# Patient Record
Sex: MALE | Race: WHITE | URBAN - METROPOLITAN AREA
[De-identification: names, ages, dates, MRNs, and addresses within clinical notes are randomized per-mention and may not be internally consistent; named-entity substitution may affect disease eponyms.]

---

## 2021-05-25 ENCOUNTER — RECORDS - HEALTHEAST (OUTPATIENT)
Dept: ADMINISTRATIVE | Facility: CLINIC | Age: 86
End: 2021-05-25

## 2023-01-01 ENCOUNTER — TRANSITIONAL CARE UNIT VISIT (OUTPATIENT)
Dept: GERIATRICS | Facility: CLINIC | Age: 88
End: 2023-01-01
Payer: MEDICARE

## 2023-01-01 ENCOUNTER — LAB REQUISITION (OUTPATIENT)
Dept: LAB | Facility: CLINIC | Age: 88
End: 2023-01-01
Payer: MEDICARE

## 2023-01-01 ENCOUNTER — MEDICAL CORRESPONDENCE (OUTPATIENT)
Dept: HEALTH INFORMATION MANAGEMENT | Facility: CLINIC | Age: 88
End: 2023-01-01
Payer: OTHER MISCELLANEOUS

## 2023-01-01 ENCOUNTER — TELEPHONE (OUTPATIENT)
Dept: GERIATRICS | Facility: CLINIC | Age: 88
End: 2023-01-01
Payer: MEDICARE

## 2023-01-01 ENCOUNTER — NURSING HOME VISIT (OUTPATIENT)
Dept: GERIATRICS | Facility: CLINIC | Age: 88
End: 2023-01-01
Payer: MEDICARE

## 2023-01-01 VITALS
HEART RATE: 62 BPM | OXYGEN SATURATION: 95 % | TEMPERATURE: 98.3 F | RESPIRATION RATE: 18 BRPM | WEIGHT: 152.3 LBS | DIASTOLIC BLOOD PRESSURE: 68 MMHG | SYSTOLIC BLOOD PRESSURE: 101 MMHG

## 2023-01-01 VITALS
TEMPERATURE: 97.7 F | WEIGHT: 153.5 LBS | HEIGHT: 69 IN | SYSTOLIC BLOOD PRESSURE: 113 MMHG | DIASTOLIC BLOOD PRESSURE: 58 MMHG | BODY MASS INDEX: 22.74 KG/M2 | RESPIRATION RATE: 16 BRPM | OXYGEN SATURATION: 92 % | HEART RATE: 72 BPM

## 2023-01-01 VITALS
DIASTOLIC BLOOD PRESSURE: 60 MMHG | BODY MASS INDEX: 23.92 KG/M2 | WEIGHT: 161.5 LBS | HEART RATE: 68 BPM | RESPIRATION RATE: 18 BRPM | SYSTOLIC BLOOD PRESSURE: 135 MMHG | TEMPERATURE: 97.6 F | OXYGEN SATURATION: 94 % | HEIGHT: 69 IN

## 2023-01-01 VITALS
HEART RATE: 69 BPM | TEMPERATURE: 97.5 F | BODY MASS INDEX: 22.87 KG/M2 | SYSTOLIC BLOOD PRESSURE: 136 MMHG | OXYGEN SATURATION: 97 % | RESPIRATION RATE: 20 BRPM | HEIGHT: 69 IN | WEIGHT: 154.4 LBS | DIASTOLIC BLOOD PRESSURE: 64 MMHG

## 2023-01-01 VITALS
HEART RATE: 82 BPM | OXYGEN SATURATION: 98 % | WEIGHT: 152.9 LBS | RESPIRATION RATE: 18 BRPM | DIASTOLIC BLOOD PRESSURE: 64 MMHG | HEIGHT: 69 IN | BODY MASS INDEX: 22.64 KG/M2 | SYSTOLIC BLOOD PRESSURE: 111 MMHG | TEMPERATURE: 97.6 F

## 2023-01-01 VITALS
OXYGEN SATURATION: 92 % | DIASTOLIC BLOOD PRESSURE: 63 MMHG | RESPIRATION RATE: 16 BRPM | WEIGHT: 159.8 LBS | HEIGHT: 69 IN | BODY MASS INDEX: 23.67 KG/M2 | SYSTOLIC BLOOD PRESSURE: 116 MMHG | HEART RATE: 67 BPM | TEMPERATURE: 98.3 F

## 2023-01-01 VITALS
BODY MASS INDEX: 22.64 KG/M2 | HEIGHT: 69 IN | RESPIRATION RATE: 16 BRPM | SYSTOLIC BLOOD PRESSURE: 94 MMHG | OXYGEN SATURATION: 94 % | HEART RATE: 66 BPM | WEIGHT: 152.9 LBS | TEMPERATURE: 98 F | DIASTOLIC BLOOD PRESSURE: 53 MMHG

## 2023-01-01 VITALS
SYSTOLIC BLOOD PRESSURE: 147 MMHG | WEIGHT: 151.7 LBS | HEART RATE: 87 BPM | RESPIRATION RATE: 20 BRPM | DIASTOLIC BLOOD PRESSURE: 73 MMHG | OXYGEN SATURATION: 93 % | TEMPERATURE: 98.1 F

## 2023-01-01 DIAGNOSIS — K59.00 CONSTIPATION, UNSPECIFIED CONSTIPATION TYPE: Primary | ICD-10-CM

## 2023-01-01 DIAGNOSIS — I25.118 CORONARY ARTERY DISEASE OF NATIVE ARTERY OF NATIVE HEART WITH STABLE ANGINA PECTORIS (H): ICD-10-CM

## 2023-01-01 DIAGNOSIS — G62.9 NEUROPATHY: ICD-10-CM

## 2023-01-01 DIAGNOSIS — D64.9 ANEMIA, UNSPECIFIED: ICD-10-CM

## 2023-01-01 DIAGNOSIS — Q25.72 PULMONARY ARTERIOVENOUS MALFORMATION: ICD-10-CM

## 2023-01-01 DIAGNOSIS — I35.0 NONRHEUMATIC AORTIC VALVE STENOSIS: Primary | ICD-10-CM

## 2023-01-01 DIAGNOSIS — K59.00 CONSTIPATION, UNSPECIFIED CONSTIPATION TYPE: ICD-10-CM

## 2023-01-01 DIAGNOSIS — I35.0 NONRHEUMATIC AORTIC VALVE STENOSIS: ICD-10-CM

## 2023-01-01 DIAGNOSIS — F03.B0 MODERATE DEMENTIA WITHOUT BEHAVIORAL DISTURBANCE, PSYCHOTIC DISTURBANCE, MOOD DISTURBANCE, OR ANXIETY, UNSPECIFIED DEMENTIA TYPE (H): ICD-10-CM

## 2023-01-01 DIAGNOSIS — Z51.5 HOSPICE CARE PATIENT: Primary | ICD-10-CM

## 2023-01-01 DIAGNOSIS — I35.0 SEVERE AORTIC STENOSIS: ICD-10-CM

## 2023-01-01 DIAGNOSIS — E78.2 MIXED HYPERLIPIDEMIA: ICD-10-CM

## 2023-01-01 DIAGNOSIS — R41.89 IMPAIRED COGNITIVE ABILITY: ICD-10-CM

## 2023-01-01 DIAGNOSIS — J44.9 MODERATE CHRONIC OBSTRUCTIVE PULMONARY DISEASE (H): ICD-10-CM

## 2023-01-01 DIAGNOSIS — N39.0 URINARY TRACT INFECTION, SITE NOT SPECIFIED: ICD-10-CM

## 2023-01-01 DIAGNOSIS — Z51.5 HOSPICE CARE PATIENT: ICD-10-CM

## 2023-01-01 DIAGNOSIS — I21.4 NSTEMI (NON-ST ELEVATED MYOCARDIAL INFARCTION) (H): Primary | ICD-10-CM

## 2023-01-01 DIAGNOSIS — R53.1 WEAKNESS: ICD-10-CM

## 2023-01-01 DIAGNOSIS — I25.118 CORONARY ARTERY DISEASE OF NATIVE ARTERY OF NATIVE HEART WITH STABLE ANGINA PECTORIS (H): Primary | ICD-10-CM

## 2023-01-01 DIAGNOSIS — I21.4 NSTEMI (NON-ST ELEVATED MYOCARDIAL INFARCTION) (H): ICD-10-CM

## 2023-01-01 LAB
ANION GAP SERPL CALCULATED.3IONS-SCNC: 14 MMOL/L (ref 7–15)
BACTERIA UR CULT: NORMAL
BASOPHILS # BLD AUTO: 0.1 10E3/UL (ref 0–0.2)
BASOPHILS NFR BLD AUTO: 1 %
BUN SERPL-MCNC: 27.2 MG/DL (ref 8–23)
CALCIUM SERPL-MCNC: 9.1 MG/DL (ref 8.2–9.6)
CHLORIDE SERPL-SCNC: 99 MMOL/L (ref 98–107)
CREAT SERPL-MCNC: 0.86 MG/DL (ref 0.67–1.17)
DEPRECATED HCO3 PLAS-SCNC: 21 MMOL/L (ref 22–29)
EOSINOPHIL # BLD AUTO: 0.1 10E3/UL (ref 0–0.7)
EOSINOPHIL NFR BLD AUTO: 1 %
ERYTHROCYTE [DISTWIDTH] IN BLOOD BY AUTOMATED COUNT: 13.9 % (ref 10–15)
GFR SERPL CREATININE-BSD FRML MDRD: 82 ML/MIN/1.73M2
GLUCOSE SERPL-MCNC: 114 MG/DL (ref 70–99)
HCT VFR BLD AUTO: 40.6 % (ref 40–53)
HGB BLD-MCNC: 13.1 G/DL (ref 13.3–17.7)
IMM GRANULOCYTES # BLD: 0 10E3/UL
IMM GRANULOCYTES NFR BLD: 0 %
LYMPHOCYTES # BLD AUTO: 1.3 10E3/UL (ref 0.8–5.3)
LYMPHOCYTES NFR BLD AUTO: 16 %
MCH RBC QN AUTO: 28.2 PG (ref 26.5–33)
MCHC RBC AUTO-ENTMCNC: 32.3 G/DL (ref 31.5–36.5)
MCV RBC AUTO: 88 FL (ref 78–100)
MONOCYTES # BLD AUTO: 0.7 10E3/UL (ref 0–1.3)
MONOCYTES NFR BLD AUTO: 9 %
NEUTROPHILS # BLD AUTO: 6 10E3/UL (ref 1.6–8.3)
NEUTROPHILS NFR BLD AUTO: 73 %
NRBC # BLD AUTO: 0 10E3/UL
NRBC BLD AUTO-RTO: 0 /100
PLATELET # BLD AUTO: 154 10E3/UL (ref 150–450)
POTASSIUM SERPL-SCNC: 4.4 MMOL/L (ref 3.4–5.3)
RBC # BLD AUTO: 4.64 10E6/UL (ref 4.4–5.9)
SODIUM SERPL-SCNC: 134 MMOL/L (ref 136–145)
WBC # BLD AUTO: 8.3 10E3/UL (ref 4–11)

## 2023-01-01 PROCEDURE — 99309 SBSQ NF CARE MODERATE MDM 30: CPT | Performed by: NURSE PRACTITIONER

## 2023-01-01 PROCEDURE — 99309 SBSQ NF CARE MODERATE MDM 30: CPT | Mod: GV | Performed by: NURSE PRACTITIONER

## 2023-01-01 PROCEDURE — 82310 ASSAY OF CALCIUM: CPT | Performed by: NURSE PRACTITIONER

## 2023-01-01 PROCEDURE — 99310 SBSQ NF CARE HIGH MDM 45: CPT | Mod: GV | Performed by: NURSE PRACTITIONER

## 2023-01-01 PROCEDURE — 99309 SBSQ NF CARE MODERATE MDM 30: CPT | Mod: GV | Performed by: FAMILY MEDICINE

## 2023-01-01 PROCEDURE — P9603 ONE-WAY ALLOW PRORATED MILES: HCPCS | Performed by: FAMILY MEDICINE

## 2023-01-01 PROCEDURE — 99305 1ST NF CARE MODERATE MDM 35: CPT | Performed by: FAMILY MEDICINE

## 2023-01-01 PROCEDURE — 85025 COMPLETE CBC W/AUTO DIFF WBC: CPT | Performed by: FAMILY MEDICINE

## 2023-01-01 PROCEDURE — 87086 URINE CULTURE/COLONY COUNT: CPT | Performed by: FAMILY MEDICINE

## 2023-01-01 RX ORDER — GUAIFENESIN 200 MG/1
400 TABLET ORAL EVERY 4 HOURS PRN
COMMUNITY

## 2023-01-01 RX ORDER — ALBUTEROL SULFATE 0.63 MG/3ML
1 SOLUTION RESPIRATORY (INHALATION) 2 TIMES DAILY
COMMUNITY

## 2023-01-01 RX ORDER — ASPIRIN 81 MG/1
81 TABLET, CHEWABLE ORAL DAILY
COMMUNITY

## 2023-01-01 RX ORDER — ACETAMINOPHEN 500 MG
1000 TABLET ORAL 2 TIMES DAILY
COMMUNITY

## 2023-01-01 RX ORDER — MULTIPLE VITAMINS W/ MINERALS TAB 9MG-400MCG
1 TAB ORAL DAILY
COMMUNITY
End: 2023-01-01

## 2023-01-01 RX ORDER — GABAPENTIN 100 MG/1
200 CAPSULE ORAL 2 TIMES DAILY
COMMUNITY
End: 2023-01-01

## 2023-01-01 RX ORDER — ALBUTEROL SULFATE 0.63 MG/3ML
1 SOLUTION RESPIRATORY (INHALATION) EVERY 4 HOURS PRN
COMMUNITY
End: 2023-01-01 | Stop reason: DRUGHIGH

## 2023-01-01 RX ORDER — BISACODYL 10 MG
10 SUPPOSITORY, RECTAL RECTAL DAILY PRN
COMMUNITY
Start: 2023-01-01

## 2023-01-01 RX ORDER — SIMVASTATIN 40 MG
40 TABLET ORAL DAILY
COMMUNITY
End: 2023-01-01

## 2023-01-01 RX ORDER — GABAPENTIN 100 MG/1
CAPSULE ORAL
COMMUNITY
Start: 2023-01-01

## 2023-01-01 RX ORDER — LORAZEPAM 0.5 MG/1
0.25 TABLET ORAL
COMMUNITY
Start: 2023-01-01

## 2023-01-01 RX ORDER — POLYETHYLENE GLYCOL 3350 17 G/17G
17 POWDER, FOR SOLUTION ORAL DAILY PRN
COMMUNITY

## 2023-01-01 RX ORDER — NALOXONE HYDROCHLORIDE 0.4 MG/ML
.1-.4 INJECTION, SOLUTION INTRAMUSCULAR; INTRAVENOUS; SUBCUTANEOUS
COMMUNITY

## 2023-07-19 PROBLEM — J44.9 MODERATE CHRONIC OBSTRUCTIVE PULMONARY DISEASE (H): Status: ACTIVE | Noted: 2023-02-16

## 2023-07-19 PROBLEM — Q25.72 PULMONARY ARTERIOVENOUS MALFORMATION: Status: ACTIVE | Noted: 2023-02-16

## 2023-07-19 PROBLEM — I25.10 CORONARY ARTERY DISEASE INVOLVING NATIVE HEART: Status: ACTIVE | Noted: 2023-01-01

## 2023-07-19 PROBLEM — I35.0 AORTIC VALVE STENOSIS: Status: ACTIVE | Noted: 2023-02-16

## 2023-07-19 PROBLEM — I21.4 NSTEMI (NON-ST ELEVATED MYOCARDIAL INFARCTION) (H): Status: ACTIVE | Noted: 2023-02-16

## 2023-07-19 NOTE — PROGRESS NOTES
[facility]  PCP & CLINIC: No primary care provider on file., No primary physician on file.  Chief Complaint   Patient presents with     Hospital F/U   [place] MRN: [id number]. Place of Service where encounter took place:  [facility] ON [facility] (TCU) [4002] Luc Nguyen  is a 89 year old  (4/16/1932), admitted to the above facility from  [facility]. Hospital stay 7/15 through 7/19/23. Admitted to this facility for  rehab, medical management, and nursing care. HPI information obtained from: facility chart records, facility staff, patient report, [facility] chart review, and Care Everywhere [facility] chart review.     Brief Summary of Hospital Course: Luc presented to [place] on 7/15 w/ chest pain. He was found to have NSTEMI, was started on IV Heparin for 48 hours and was evaluated by cardiology. He and family decided at that time to enroll into hospice potentially, but then discharged to TCU.     Updates since admission to transitional care unit: Luc presented to [facility] on 7/19 s/p the above hospitalization. Today, Luc is seen after having a shower in therapy.  He says he feels so good after this.  He says his only real complaint is the pins-and-needles he feels in his heels, which kept him up last night.  He says he has had this for a long time, and usually puts some cream on to help.  He acknowledges that he had a heart attack, and says that he feels a lot better now.  He denies headaches, dizziness, chest pain, palpitations, and he is not really having shortness of breath unless he exerts himself.  He does not think he is having a lot of swelling either.  His appetite is pretty good, and he denies any nausea.  He says his bowels and bladder are baseline, and he wears a depends because his bladder leaks often.    CODE STATUS/ADVANCE DIRECTIVES DISCUSSION: DNR / DNI. Patient's living condition: lives alone. ALLERGIES: Patient has no known allergies. PAST MEDICAL HISTORY:  has no past  medical history on file.. PAST SURGICAL HISTORY:   has no past surgical history on file.. FAMILY HISTORY: family history is not on file.. SOCIAL HISTORY:     Post Discharge Medication Reconciliation Status: discharge medications reconciled and changed, per note/orders.  Current Outpatient Medications   Medication Sig Dispense Refill     acetaminophen (TYLENOL) 500 MG tablet Take 1,000 mg by mouth every 6 hours as needed for mild pain       albuterol (ACCUNEB) 0.63 MG/3ML neb solution Take 1 ampule by nebulization every 4 hours as needed for shortness of breath, wheezing or cough       aspirin (ASA) 81 MG chewable tablet Take 81 mg by mouth daily       carbamide peroxide (DEBROX) 6.5 % otic solution Place 5 drops into both ears 2 times daily as needed for other       diclofenac (VOLTAREN) 1 % topical gel Apply topically 4 times daily       gabapentin (NEURONTIN) 100 MG capsule Take 100 mg by mouth 2 times daily       multivitamin w/minerals (THERA-VIT-M) tablet Take 1 tablet by mouth daily       naloxone (NARCAN) 0.4 MG/ML injection Inject 0.1-0.4 mg into the vein ) Inject 1 ml intramuscularly as needed  for opioid overdose Give 1 ml as needed for opioid  overdose. May give 1 ml in 2-3 minutes if patient is  still unresponsive       polyethylene glycol (MIRALAX) 17 g packet Take 17 g by mouth daily       simvastatin (ZOCOR) 40 MG tablet Take 40 mg by mouth daily       ROS: Limited secondary to cognitive impairment but today pt reports the above and 10 point ROS of systems including Constitutional, Eyes, Respiratory, Cardiovascular, Gastroenterology, Genitourinary, Integumentary, Musculoskeletal, Psychiatric were all negative except for pertinent positives noted in my HPI.    Vitals: /68   Pulse 62   Temp 98.3  F (36.8  C)   Resp 18   Wt 69.1 kg (152 lb 4.8 oz)   SpO2 95%   Exam:  GENERAL APPEARANCE: Alert, in no distress, cooperative.   ENT: Mouth/posterior oropharynx intact w/ moist mucous membranes,  hearing acuity [place].  EYES: EOM, conjunctivae, lids, pupils and irises normal, PERRL2.   RESP: Respiratory effort good, no respiratory distress, Lung sounds clear/diminished. On RA.   CV: Auscultation of heart reveals S1, S2, rate and rhythm regular, 2/6 systolic murmur, no rub or gallop, Edema trace BLE. Peripheral pulses are 2+.  ABDOMEN: Normal bowel sounds, soft, non-tender abdomen, and no masses palpated.  SKIN: Inspection/Palpation of skin and subcutaneous tissue baseline w/ fragility. No wounds/rashes noted.   NEURO: CN II-XII at patient's baseline, sensation baseline PPS.  PSYCH: Insight, judgement, and memory are baseline impaired, affect and mood are happy/engaged.    Lab/Diagnostic data: Recent labs in [facility] reviewed by me today.     ASSESSMENT/PLAN:  NSTEMI (non-ST elevated myocardial infarction) (H)  Coronary artery disease of native artery of native heart with stable angina pectoris (H)  Pulmonary arteriovenous malformation  Nonrheumatic aortic valve stenosis  Neuropathy  Impaired cognitive ability  Acute on chronic. Complex.     Provider extensively reviewed records from inpatient and from facility.    Provider coordinated care with rehabilitation services, who reported SLUMS 16/30.  Luc was driving prior to this heart attack, and he likely should not have been.    Will reduce polypharmacy, decreasing multivitamin to daily and discontinuing guaifenesin as this is available on standing house orders.    Noting ongoing anemia and weakness, which could be secondary to his recent hospitalization or chronic.  Either way, this will affect his rehabilitation capability, and therefore will trend.    Noting some neuropathy type symptoms reported, which are worse at night.  Luc reports this to be a longstanding problem, but upon chart review a cream to help neuropathy was not located.  We will therefore trial a chest dose of capsaicin to see if he likes this.    Upon chart review, noting that this patient  and his family had opted for hospice services, yet he is rehabbing in TCU (which is usually contradictory).  This may complicate his discharge.  Provider coordinated care with  to determine if his goal/plan will be to rehab/optimize and return home and then enroll in hospice, or if he will be staying to optimize and then move into long-term care with hospice.  Certainly, his age and overall debility would be appropriate for hospice enrollment.  Given his cognitive impairment, would not recommend that he return home.  We may see improvement here with routine/activity in TCU, hydration/nutrition, and the overall controlled environment that is offerred.  Follow up w/in 1 week or as needed.     Orders:  1. Decrease MVI to 1 tab PO QAM. Dx: malnutrition.   2. Discontinue Guaifenesin (may use PIERRE).   3. Hgb+BMP x1 on 7/24. Dx: anemia/weakness.  4. Capsaicin cream 0.025%, apply to feet at bedtime. Dx: neuropathy.     Electronically signed by:  LAVINIA Landry CNP DNP

## 2023-07-20 NOTE — LETTER
7/20/2023        RE: Luc Nguyen  [address] [place] [address]        [facility] TCU Admission  PCP & CLINIC: No primary care provider on file., No primary physician on file.  Chief Complaint   Patient presents with     Hospital F/U   [place] MRN: [id number]. Place of Service where encounter took place:  [facility] (TCU) [4002] Luc Nguyen  is a 89 year old  (4/16/1932), admitted to the above facility from  [facility]. Hospital stay 7/15 through 7/19/23. Admitted to this facility for  rehab, medical management, and nursing care. HPI information obtained from: facility chart records, facility staff, patient report, [facility] chart review, and Care Everywhere [facility] chart review.     Brief Summary of Hospital Course: Luc presented to [place] on 7/15 w/ chest pain. He was found to have NSTEMI, was started on IV Heparin for 48 hours and was evaluated by cardiology. He and family decided at that time to enroll into hospice potentially, but then discharged to TCU.     Updates since admission to transitional care unit: Luc presented to TCU on 7/19 s/p the above hospitalization. Today, Luc is seen after having a shower in therapy.  He says he feels so good after this.  He says his only real complaint is the pins-and-needles he feels in his heels, which kept him up last night.  He says he has had this for a long time, and usually puts some cream on to help.  He acknowledges that he had a heart attack, and says that he feels a lot better now.  He denies headaches, dizziness, chest pain, palpitations, and he is not really having shortness of breath unless he exerts himself.  He does not think he is having a lot of swelling either.  His appetite is pretty good, and he denies any nausea.  He says his bowels and bladder are baseline, and he wears a depends because his bladder leaks often.    CODE STATUS/ADVANCE DIRECTIVES DISCUSSION: DNR / DNI. Patient's living condition: lives  alone. ALLERGIES: Patient has no known allergies. PAST MEDICAL HISTORY:  has no past medical history on file.. PAST SURGICAL HISTORY:   has no past surgical history on file.. FAMILY HISTORY: family history is not on file.. SOCIAL HISTORY:     Post Discharge Medication Reconciliation Status: discharge medications reconciled and changed, per note/orders.  Current Outpatient Medications   Medication Sig Dispense Refill     acetaminophen (TYLENOL) 500 MG tablet Take 1,000 mg by mouth every 6 hours as needed for mild pain       albuterol (ACCUNEB) 0.63 MG/3ML neb solution Take 1 ampule by nebulization every 4 hours as needed for shortness of breath, wheezing or cough       aspirin (ASA) 81 MG chewable tablet Take 81 mg by mouth daily       carbamide peroxide (DEBROX) 6.5 % otic solution Place 5 drops into both ears 2 times daily as needed for other       diclofenac (VOLTAREN) 1 % topical gel Apply topically 4 times daily       gabapentin (NEURONTIN) 100 MG capsule Take 100 mg by mouth 2 times daily       multivitamin w/minerals (THERA-VIT-M) tablet Take 1 tablet by mouth daily       naloxone (NARCAN) 0.4 MG/ML injection Inject 0.1-0.4 mg into the vein ) Inject 1 ml intramuscularly as needed  for opioid overdose Give 1 ml as needed for opioid  overdose. May give 1 ml in 2-3 minutes if patient is  still unresponsive       polyethylene glycol (MIRALAX) 17 g packet Take 17 g by mouth daily       simvastatin (ZOCOR) 40 MG tablet Take 40 mg by mouth daily       ROS: Limited secondary to cognitive impairment but today pt reports the above and 10 point ROS of systems including Constitutional, Eyes, Respiratory, Cardiovascular, Gastroenterology, Genitourinary, Integumentary, Musculoskeletal, Psychiatric were all negative except for pertinent positives noted in my HPI.    Vitals: /68   Pulse 62   Temp 98.3  F (36.8  C)   Resp 18   Wt 69.1 kg (152 lb 4.8 oz)   SpO2 95%   Exam:  GENERAL APPEARANCE: Alert, in no distress,  cooperative.   ENT: Mouth/posterior oropharynx intact w/ moist mucous membranes, hearing acuity [place].  EYES: EOM, conjunctivae, lids, pupils and irises normal, PERRL2.   RESP: Respiratory effort good, no respiratory distress, Lung sounds clear/diminished. On RA.   CV: Auscultation of heart reveals S1, S2, rate and rhythm regular, 2/6 systolic murmur, no rub or gallop, Edema trace BLE. Peripheral pulses are 2+.  ABDOMEN: Normal bowel sounds, soft, non-tender abdomen, and no masses palpated.  SKIN: Inspection/Palpation of skin and subcutaneous tissue baseline w/ fragility. No wounds/rashes noted.   NEURO: CN II-XII at patient's baseline, sensation baseline PPS.  PSYCH: Insight, judgement, and memory are baseline impaired, affect and mood are happy/engaged.    Lab/Diagnostic data: Recent labs in [facility] reviewed by me today.     ASSESSMENT/PLAN:  NSTEMI (non-ST elevated myocardial infarction) (H)  Coronary artery disease of native artery of native heart with stable angina pectoris (H)  Pulmonary arteriovenous malformation  Nonrheumatic aortic valve stenosis  Neuropathy  Impaired cognitive ability  Acute on chronic. Complex.     Provider extensively reviewed records from inpatient and from facility.    Provider coordinated care with rehabilitation services, who reported SLUMS 16/30.  Luc was driving prior to this heart attack, and he likely should not have been.    Will reduce polypharmacy, decreasing multivitamin to daily and discontinuing guaifenesin as this is available on standing house orders.    Noting ongoing anemia and weakness, which could be secondary to his recent hospitalization or chronic.  Either way, this will affect his rehabilitation capability, and therefore will trend.    Noting some neuropathy type symptoms reported, which are worse at night.  Luc reports this to be a longstanding problem, but upon chart review a cream to help neuropathy was not located.  We will therefore trial a chest dose of  capsaicin to see if he likes this.    Upon chart review, noting that this patient and his family had opted for hospice services, yet he is rehabbing in TCU (which is usually contradictory).  This may complicate his discharge.  Provider coordinated care with  to determine if his goal/plan will be to rehab/optimize and return home and then enroll in hospice, or if he will be staying to optimize and then move into long-term care with hospice.  Certainly, his age and overall debility would be appropriate for hospice enrollment.  Given his cognitive impairment, would not recommend that he return home.  We may see improvement here with routine/activity in TCU, hydration/nutrition, and the overall controlled environment that is offerred.  Follow up w/in 1 week or as needed.     Orders:  1. Decrease MVI to 1 tab PO QAM. Dx: malnutrition.   2. Discontinue Guaifenesin (may use PIERRE).   3. Hgb+BMP x1 on 7/24. Dx: anemia/weakness.  4. Capsaicin cream 0.025%, apply to feet at bedtime. Dx: neuropathy.     Electronically signed by:  Dr. Roxane Cuevas APRN CNP DNP                          Sincerely,        Roxane Cuevas, LAVINIA CNP

## 2023-07-23 NOTE — PROGRESS NOTES
Staff called this evening reporting patient complaints of achiness and malaise, temperature checked and noted to be 103.4, other vital signs stable outside of mild tachycardia at 102 bpm.  Patient's lung sounds clear, no other visible infection externally.  He has received Tylenol.  Nursing instructed to place cool compresses behind neck, and bilateral axilla, and behind posterior bilateral knees.     Orders:  [facility] UC  CBC and  BMP on Monday  Follow-up with primary  Update on-call providers for further change in condition    Dr. Rose Troy, APRN, DNP

## 2023-07-24 NOTE — TELEPHONE ENCOUNTER
[facility] Lab Note     Provider: LAVINIA Joyner CNP, DNP  Facility: [facility] Facility Type:  TCU    No Known Allergies    Labs Reviewed by provider: CBC and BMP    Verbal Order/Direction given by Provider: NNO    Provider giving Order:  LAVINIA Joyner CNP, DNP    Verbal Order given to: Corina Haley RN

## 2023-07-25 PROBLEM — E78.2 MIXED HYPERLIPIDEMIA: Status: ACTIVE | Noted: 2023-02-16

## 2023-07-25 PROBLEM — M75.100 ROTATOR CUFF TEAR ARTHROPATHY: Status: ACTIVE | Noted: 2023-01-01

## 2023-07-25 PROBLEM — H54.50: Status: ACTIVE | Noted: 2023-01-01

## 2023-07-25 PROBLEM — H61.23 IMPACTED CERUMEN, BILATERAL: Status: ACTIVE | Noted: 2023-01-01

## 2023-07-25 PROBLEM — S93.402A SPRAIN OF UNSPECIFIED LIGAMENT OF LEFT ANKLE, INITIAL ENCOUNTER: Status: ACTIVE | Noted: 2023-01-01

## 2023-07-25 PROBLEM — H47.20 UNSPECIFIED OPTIC ATROPHY: Status: ACTIVE | Noted: 2023-01-01

## 2023-07-25 PROBLEM — M12.819 ROTATOR CUFF TEAR ARTHROPATHY: Status: ACTIVE | Noted: 2023-01-01

## 2023-07-25 PROBLEM — J85.2 ABSCESS OF LUNG (H): Status: ACTIVE | Noted: 2023-01-01

## 2023-07-25 PROBLEM — N40.0 BENIGN PROSTATIC HYPERPLASIA: Status: ACTIVE | Noted: 2023-02-16

## 2023-07-25 PROBLEM — H53.9 UNSPECIFIED VISUAL DISTURBANCE: Status: ACTIVE | Noted: 2023-01-01

## 2023-07-25 PROBLEM — Z91.81 HISTORY OF FALL: Status: ACTIVE | Noted: 2023-01-01

## 2023-07-25 PROBLEM — H93.13 TINNITUS, BILATERAL: Status: ACTIVE | Noted: 2023-01-01

## 2023-07-25 PROBLEM — H35.363 DRUSEN (DEGENERATIVE) OF MACULA, BILATERAL: Status: ACTIVE | Noted: 2023-01-01

## 2023-07-25 PROBLEM — H35.30 UNSPECIFIED MACULAR DEGENERATION: Status: ACTIVE | Noted: 2023-01-01

## 2023-07-25 NOTE — PROGRESS NOTES
[facility]  Episodic/Acute/Follow-Up  [place] MRN: [id number]. [facility]   Chief Complaint   Patient presents with    RECHECK    HPI: Luc Nguyen  is a 89 year old (4/16/1932), who is being seen today for an episodic care visit. Today's concern is:    [place] seen today on routine follow-up in TCU.  Last week, we trialed the start of capsaicin at at bedtime to help him with neuropathy symptoms while he sleeps.    Today, Dallas says capsaicin has not been helpful.  He still feels like he wakes up with neuropathy and even describes achiness on and off in lower extremities.  He denies any new or ongoing chest pain, says sometimes he gets short of breath, but he does not feel like he is swollen.  He denies headaches or dizziness.    Past Medical and Surgical History reviewed in Epic today.  MEDICATIONS:  Current Outpatient Medications   Medication Sig Dispense Refill    acetaminophen (TYLENOL) 500 MG tablet Take 1,000 mg by mouth 2 times daily +650mg PO BID PRN      albuterol (ACCUNEB) 0.63 MG/3ML neb solution Take 1 ampule by nebulization every 4 hours as needed for shortness of breath, wheezing or cough      aspirin (ASA) 81 MG chewable tablet Take 81 mg by mouth daily      carbamide peroxide (DEBROX) 6.5 % otic solution Place 5 drops into both ears 2 times daily as needed for other      diclofenac (VOLTAREN) 1 % topical gel Apply topically 4 times daily      gabapentin (NEURONTIN) 100 MG capsule Take 200 mg by mouth 2 times daily      multivitamin w/minerals (THERA-VIT-M) tablet Take 1 tablet by mouth daily      naloxone (NARCAN) 0.4 MG/ML injection Inject 0.1-0.4 mg into the vein ) Inject 1 ml intramuscularly as needed  for opioid overdose Give 1 ml as needed for opioid  overdose. May give 1 ml in 2-3 minutes if patient is  still unresponsive      polyethylene glycol (MIRALAX) 17 g packet Take 17 g by mouth daily       simvastatin (ZOCOR) 40 MG tablet Take 40 mg by mouth daily       Objective: BP (!) 147/73   Pulse 87   Temp 98.1  F (36.7  C)   Resp 20   Wt 68.8 kg (151 lb 11.2 oz)   SpO2 93%   Exam:  GENERAL APPEARANCE: Alert, in no distress, cooperative.   RESP: Respiratory effort good, no respiratory distress, Lung sounds clear. On RA.   CV: Auscultation of heart reveals S1, S2, rate and rhythm regular, no murmur, no rub or gallop, Edema 0+ BLE. Peripheral pulses are 2+.  PSYCH: Insight, judgement, and memory are impaired at baseline, affect and mood are happy/engaged.    Labs: Labs done in facility are in EPIC. Please refer to them using [employer] Everywhere.    ASSESSMENT/PLAN:  NSTEMI (non-ST elevated myocardial infarction) (H)  Coronary artery disease of native artery of native heart with stable angina pectoris (H)  Pulmonary arteriovenous malformation  Nonrheumatic aortic valve stenosis  Neuropathy  Impaired cognitive ability  Acute on chronic. Ongoing.  Noting physical therapy is likely helping with a lot of deconditioning, but [place] may be having some achiness from this likely due to some underlying arthritis.  The description of achiness is intermittent, but perhaps pain control with some additional Tylenol on a schedule would be helpful.  D[place] does not always ask for as needed medications.  Capsaicin not very helpful.  Will discontinue.  Provider coordinated care with nursing, and they are going to institute a foot soak before bedtime to help him relax.  It may also help to slightly increase gabapentin.  We will increase as noted below.  There are no ongoing signs of chest pain, CHF/fluid retention. Weights are stable. SOB likely from deconditioning.   Follow up w/in 1 week or as needed.    Orders:  Tylenol 1000mg PO BID. Dx: OA.  Discontinue Capsaicin.  Increase Gabapentin to 200mg BID. Dx: neuropathy.   Change PRN Tylenol to 650mg PO BID PRN. Dx: pain/fever.     Electronically signed by:  LAVINIA Landry  CNP DNP

## 2023-07-25 NOTE — LETTER
7/25/2023        RE: Luc Nguyen  [address] [place] [address]        [facility] GERIATRIC SERVICE  Episodic/Acute/Follow-Up  [place] MRN: [id number]. Place of Service where encounter took place:  [facility] [4002]   Chief Complaint   Patient presents with     RECHECK    HPI: Luc Nguyen  is a 89 year old (4/16/1932), who is being seen today for an episodic care visit. Today's concern is:    [place] seen today on routine follow-up in TCU.  Last week, we trialed the start of capsaicin at at bedtime to help him with neuropathy symptoms while he sleeps.    Today, [place] says capsaicin has not been helpful.  He still feels like he wakes up with neuropathy and even describes achiness on and off in lower extremities.  He denies any new or ongoing chest pain, says sometimes he gets short of breath, but he does not feel like he is swollen.  He denies headaches or dizziness.    Past Medical and Surgical History reviewed in Epic today.  MEDICATIONS:  Current Outpatient Medications   Medication Sig Dispense Refill     acetaminophen (TYLENOL) 500 MG tablet Take 1,000 mg by mouth 2 times daily +650mg PO BID PRN       albuterol (ACCUNEB) 0.63 MG/3ML neb solution Take 1 ampule by nebulization every 4 hours as needed for shortness of breath, wheezing or cough       aspirin (ASA) 81 MG chewable tablet Take 81 mg by mouth daily       carbamide peroxide (DEBROX) 6.5 % otic solution Place 5 drops into both ears 2 times daily as needed for other       diclofenac (VOLTAREN) 1 % topical gel Apply topically 4 times daily       gabapentin (NEURONTIN) 100 MG capsule Take 200 mg by mouth 2 times daily       multivitamin w/minerals (THERA-VIT-M) tablet Take 1 tablet by mouth daily       naloxone (NARCAN) 0.4 MG/ML injection Inject 0.1-0.4 mg into the vein ) Inject 1 ml intramuscularly as needed  for opioid overdose Give 1 ml as needed for opioid  overdose. May give 1 ml in 2-3 minutes if patient is  still  unresponsive       polyethylene glycol (MIRALAX) 17 g packet Take 17 g by mouth daily       simvastatin (ZOCOR) 40 MG tablet Take 40 mg by mouth daily       Objective: BP (!) 147/73   Pulse 87   Temp 98.1  F (36.7  C)   Resp 20   Wt 68.8 kg (151 lb 11.2 oz)   SpO2 93%   Exam:  GENERAL APPEARANCE: Alert, in no distress, cooperative.   RESP: Respiratory effort good, no respiratory distress, Lung sounds clear. On RA.   CV: Auscultation of heart reveals S1, S2, rate and rhythm regular, no murmur, no rub or gallop, Edema 0+ BLE. Peripheral pulses are 2+.  PSYCH: Insight, judgement, and memory are impaired at baseline, affect and mood are happy/engaged.    Labs: Labs done in facility are in EPIC. Please refer to them using [employer] Everywhere.    ASSESSMENT/PLAN:  NSTEMI (non-ST elevated myocardial infarction) (H)  Coronary artery disease of native artery of native heart with stable angina pectoris (H)  Pulmonary arteriovenous malformation  Nonrheumatic aortic valve stenosis  Neuropathy  Impaired cognitive ability  Acute on chronic. Ongoing.  Noting physical therapy is likely helping with a lot of deconditioning, but [place] may be having some achiness from this likely due to some underlying arthritis.  The description of achiness is intermittent, but perhaps pain control with some additional Tylenol on a schedule would be helpful.  [place] does not always ask for as needed medications.  Capsaicin not very helpful.  Will discontinue.  Provider coordinated care with nursing, and they are going to institute a foot soak before bedtime to help him relax.  It may also help to slightly increase gabapentin.  We will increase as noted below.  There are no ongoing signs of chest pain, CHF/fluid retention. Weights are stable. SOB likely from deconditioning.   Follow up w/in 1 week or as needed.    Orders:  Tylenol 1000mg PO BID. Dx: OA.  Discontinue Capsaicin.  Increase Gabapentin to 200mg BID. Dx: neuropathy.   Change PRN Tylenol to  650mg PO BID PRN. Dx: pain/fever.     Electronically signed by:  Dr. Roxane Cuevas, LAVINIA CNP DNP        Sincerely,        LAVINIA Jensen CNP

## 2023-07-27 NOTE — TELEPHONE ENCOUNTER
[facility] Triage Nurse Telephone Encounter    Provider: LAVINIA Joyner CNP, DNP  Facility: [facility] Facility Type:  TCU    Caller: Corina  Call Back Number: [phone]    Allergies:  No Known Allergies     Reason for call: Pt has COPD and family is requesting that his nebulizer treatment be scheduled as is at home.    Verbal Order/Direction given by Provider:   1) Albuterol Neb BID and BID PRN  2) Guaifenesin (plain) 400 mg PO q 4 hours prn (expectorant)     Provider giving Order:  Tesfaye Boggs MD    Verbal Order given to: Corina Castle RN

## 2023-07-27 NOTE — PROGRESS NOTES
"[place] [facility]  PRIMARY CARE PROVIDER AND CLINIC:  Physician No Ref-Primary, No address on file  No chief complaint on file.    [place] Medical Record Number:  [id number]  Place of Service where encounter took place:  No question data found.    Luc Nguyen  is a 89 year old  (4/16/1932), admitted to the above facility from  [facility] . Hospital stay 7/15/23 through 7/19/23..  Admitted to this facility for  rehab, medical management, and nursing care.    HPI:    HPI information obtained from: facility chart records, facility staff, patient report and [facility] chart review.   Brief Summary of Hospital Course:   As per DNP's note:\"Luc presented to [facility] on 7/15 w/ chest pain. He was found to have NSTEMI, was started on IV Heparin for 48 hours and was evaluated by cardiology. He and family decided at that time to enroll into hospice potentially, but then discharged to TCU.       Updates on Status Since Skilled nursing Admission:   [place]um 16/30 7/25: gabapentin increased, capsicin stopped  7/27: home dose of Albuterol bid started plus bid prn, also, guaifenesin 400 mg q 4 hr prn started.     Today:  - NSTEMI: no cp.   - Debility: making a progress with rehab.  - COPD:  Reports breathing is fine.   -hospice: enrolled into hospice Feb 2023 and disenrolled due to stability in may. Wants to stay a this facility, try therpay first and see how it goes.   - Neuropathy: worse at night, not that bad during day time.     ==========================================  CODE STATUS/ADVANCE DIRECTIVES DISCUSSION:   DNR / DNI  Patient's living condition: lives alone  ALLERGIES: Patient has no known allergies.  PAST MEDICAL HISTORY:  has no past medical history on file.  PAST SURGICAL HISTORY:   has no past surgical history on file.  FAMILY HISTORY: family history is not on file.  SOCIAL HISTORY:       Post Discharge Medication Reconciliation Status: discharge medications reconciled and changed, per " note/orders  Current Outpatient Medications   Medication Sig Dispense Refill     acetaminophen (TYLENOL) 500 MG tablet Take 1,000 mg by mouth 2 times daily +650mg PO BID PRN       albuterol (ACCUNEB) 0.63 MG/3ML neb solution Take 1 ampule by nebulization 2 times daily May also take BID PRN       aspirin (ASA) 81 MG chewable tablet Take 81 mg by mouth daily       carbamide peroxide (DEBROX) 6.5 % otic solution Place 5 drops into both ears 2 times daily as needed for other       diclofenac (VOLTAREN) 1 % topical gel Apply topically 4 times daily       gabapentin (NEURONTIN) 100 MG capsule Take 200 mg by mouth 2 times daily       guaiFENesin (ORGANIDIN) 200 MG tablet Take 400 mg by mouth every 4 hours as needed for cough       multivitamin w/minerals (THERA-VIT-M) tablet Take 1 tablet by mouth daily       naloxone (NARCAN) 0.4 MG/ML injection Inject 0.1-0.4 mg into the vein ) Inject 1 ml intramuscularly as needed  for opioid overdose Give 1 ml as needed for opioid  overdose. May give 1 ml in 2-3 minutes if patient is  still unresponsive       polyethylene glycol (MIRALAX) 17 g packet Take 17 g by mouth daily       simvastatin (ZOCOR) 40 MG tablet Take 40 mg by mouth daily       ROS:  10 point ROS of systems including Constitutional, Eyes, Respiratory, Cardiovascular, Gastroenterology, Genitourinary, Integumentary, Musculoskeletal, Psychiatric were all negative except for pertinent positives noted in my HPI.    Vitals:  There were no vitals taken for this visit.  Exam:  GENERAL APPEARANCE:  in no distress,   RESP:  Unlabored breathing. CTA b/l.   CV:  S1S2 audible, regular HR, systolic murmur appreciated.  Over right side.   ABDOMEN:  soft, NT/ND, BS audible.   M/S:   no joint deformity noted on observation.   SKIN:  No rash noted on observation  NEURO:   No NFD appreciated on observation.   PSYCH:  affect and mood normal      Lab/Diagnostic data: Reviewed in the chart and EHR.        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   - * Evaluated by PMR and felt to benefit from TCU placement.  started rehab program, making a progress, continue until desired goal is achieved.   - Daughter at the bedside reports father would want to stay here as an LTC. Asked if he will still get some rehab. He is a [employer] will family is working on support.       (G62.9) Neuropathy  - feet, worse at night, currently on gabapentin 200 mg bid. Will increase night dose to 400 mg, keep day time as is.   - assess in one week.       Electronically signed by:  Tesfaye Boggs MD

## 2023-07-28 NOTE — LETTER
"    7/28/2023        RE: Luc Nguyen  [address] [place] [address]        [place] [facility]  PRIMARY CARE PROVIDER AND CLINIC:  Physician No Ref-Primary, No address on file  No chief complaint on file.    [place] Medical Record Number:  [id number]  Place of Service where encounter took place:  No question data found.    Luc Nguyen  is a 89 year old  (4/16/1932), admitted to the above facility from  [facility] . Hospital stay 7/15/23 through 7/19/23..  Admitted to this facility for  rehab, medical management, and nursing care.    HPI:    HPI information obtained from: facility chart records, facility staff, patient report and [facility] chart review.   Brief Summary of Hospital Course:   As per DNP's note:\"Luc presented to [facility] on 7/15 w/ chest pain. He was found to have NSTEMI, was started on IV Heparin for 48 hours and was evaluated by cardiology. He and family decided at that time to enroll into hospice potentially, but then discharged to TCU.       Updates on Status Since Skilled nursing Admission:   [place] 16/30 7/25: gabapentin increased, capsicin stopped  7/27: home dose of Albuterol bid started plus bid prn, also, guaifenesin 400 mg q 4 hr prn started.     Today:  - NSTEMI: no cp.   - Debility: making a progress with rehab.  - COPD:  Reports breathing is fine.   -hospice: enrolled into hospice Feb 2023 and disenrolled due to stability in may. Wants to stay a this facility, try therpay first and see how it goes.   - Neuropathy: worse at night, not that bad during day time.     ==========================================  CODE STATUS/ADVANCE DIRECTIVES DISCUSSION:   DNR / DNI  Patient's living condition: lives alone  ALLERGIES: Patient has no known allergies.  PAST MEDICAL HISTORY:  has no past medical history on file.  PAST SURGICAL HISTORY:   has no past surgical history on file.  FAMILY HISTORY: family history is not on file.  SOCIAL HISTORY:       Post Discharge " Medication Reconciliation Status: discharge medications reconciled and changed, per note/orders  Current Outpatient Medications   Medication Sig Dispense Refill     acetaminophen (TYLENOL) 500 MG tablet Take 1,000 mg by mouth 2 times daily +650mg PO BID PRN       albuterol (ACCUNEB) 0.63 MG/3ML neb solution Take 1 ampule by nebulization 2 times daily May also take BID PRN       aspirin (ASA) 81 MG chewable tablet Take 81 mg by mouth daily       carbamide peroxide (DEBROX) 6.5 % otic solution Place 5 drops into both ears 2 times daily as needed for other       diclofenac (VOLTAREN) 1 % topical gel Apply topically 4 times daily       gabapentin (NEURONTIN) 100 MG capsule Take 200 mg by mouth 2 times daily       guaiFENesin (ORGANIDIN) 200 MG tablet Take 400 mg by mouth every 4 hours as needed for cough       multivitamin w/minerals (THERA-VIT-M) tablet Take 1 tablet by mouth daily       naloxone (NARCAN) 0.4 MG/ML injection Inject 0.1-0.4 mg into the vein ) Inject 1 ml intramuscularly as needed  for opioid overdose Give 1 ml as needed for opioid  overdose. May give 1 ml in 2-3 minutes if patient is  still unresponsive       polyethylene glycol (MIRALAX) 17 g packet Take 17 g by mouth daily       simvastatin (ZOCOR) 40 MG tablet Take 40 mg by mouth daily       ROS:  10 point ROS of systems including Constitutional, Eyes, Respiratory, Cardiovascular, Gastroenterology, Genitourinary, Integumentary, Musculoskeletal, Psychiatric were all negative except for pertinent positives noted in my HPI.    Vitals:  There were no vitals taken for this visit.  Exam:  GENERAL APPEARANCE:  in no distress,   RESP:  Unlabored breathing. CTA b/l.   CV:  S1S2 audible, regular HR, systolic murmur appreciated.  Over right side.   ABDOMEN:  soft, NT/ND, BS audible.   M/S:   no joint deformity noted on observation.   SKIN:  No rash noted on observation  NEURO:   No NFD appreciated on observation.   PSYCH:  affect and mood  normal      Lab/Diagnostic data: Reviewed in the chart and EHR.        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   - * Evaluated by PMR and felt to benefit from TCU placement.  started rehab program, making a progress, continue until desired goal is achieved.   - Daughter at the bedside reports father would want to stay here as an LTC. Asked if he will still get some rehab. He is a [employer] will family is working on support.       (G62.9) Neuropathy  - feet, worse at night, currently on gabapentin 200 mg bid. Will increase night dose to 400 mg, keep day time as is.   - assess in one week.       Electronically signed by:  Tesfaye Boggs MD        Sincerely,        Tesfaye Boggs MD

## 2023-08-01 NOTE — LETTER
"    8/1/2023        RE: Luc Nguyen  [address] [place] [address]        [facility]  Chief Complaint   Patient presents with     RECHECK     HPI:  Luc Nguyen is a 89 year old  (4/16/1932), who is being seen today for an episodic care visit at: [facility] (U) [[id number]].     Background:    This is a 89-year-old male, with a past medical history significant for coronary artery disease, severe aortic stenosis, and COPD, who was admitted to [facility] 7/15/23 through 7/19/23 for chest pain. Found to have NSTEMI. Declined invasive procedures. PTA Tamsulosin and Isosorbide Mononitrate discontinue due to hypotension. Started on Gabapentin for neuropathy. Patient and family elected to try a TCU stay with plans to enroll in hospice if further decline.     Today's concern is:     Constipation. Today, patient reports he hasn't had a bowel movement in 4 days. No abdominal pain. Upon review of documentation, has had 5 bowel movements in the past 5 days.     Recent NSTEMI 7/15/23 with Coronary Artery Disease, Hyperlipidemia, Hypertension, and Severe Aortic Stenosis. Upon review of blood pressures over the past 5 days, systolic range from . Diastolic range from 53-66.    Allergies, and PMH/PSH reviewed in EPIC today.  REVIEW OF SYSTEMS:  4 point ROS including Respiratory, CV, GI and , other than that noted in the HPI,  is negative    Objective:   /64   Pulse 82   Temp 97.6  F (36.4  C)   Resp 18   Ht 1.753 m (5' 9\")   Wt 69.4 kg (152 lb 14.4 oz)   SpO2 98%   BMI 22.58 kg/m    GENERAL APPEARANCE:  Alert, in no distress  ENT:  Mouth and posterior oropharynx normal, moist mucous membranes  EYES:  EOM, conjunctivae, lids, pupils and irises normal  RESP:  no respiratory distress  ABDOMEN:  normal bowel sounds, soft, nontender, no hepatosplenomegaly or other masses  PSYCH:  oriented to person    Labs done in SNF are in [facility] EPIC. Please refer to them using EPIC/Care " Everywhere.    Assessment/Plan:  Constipation. Patient reports not having a bowel movement in 4 days, but documentation suggests more regular bowel movements. As patient is a poor historian secondary to cognitive impairment, will continue Miralax as ordered.    Recent NSTEMI 7/15/23 with Coronary Artery Disease, Hyperlipidemia, Hypertension, and Severe Aortic Stenosis. Blood pressures soft, but stable.  Continue Aspirin and Simvastatin as ordered for now. Hospice to review medications upon enrollment and reduce polypharmacy    Neuropathy. Continue Diclofenac and Gabapentin as ordered. Gabapentin last increased 7/28/23.    Chronic Anemia. Recent baseline Hemoglobin ~ 11. Last Hemoglobin 13.1 on 7/24/23. Will no longer check labs given planned hospice enrollment.     Benign Prostatic Hypertrophy. Previously on Tamsulosin, but this was discontinued due to hypotension.    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 Incidental finding on 2/16/23 chest CT. Has not yet seen Pulmonology due to previous hospice enrollment. Follow-up as indicated depending upon goals of care.    Cognitive Impairment. SLUMS 16/30 and ACL 3.8/5.8. Lived home alone PTA.  to assist with appropriate discharge disposition.     Physical Deconditioning. Secondary to recent hospitalization and co-morbidities. Completed Occupational Therapy. Continue Physical Therapy. Tentative plan is to discharge from therapy 8/7/23 with transition to [facility] 8/8/23.    Orders:  None    Electronically signed by: LAVINIA Harvey CNP         Sincerely,        LAVINIA Harvey CNP

## 2023-08-01 NOTE — PROGRESS NOTES
"[facility]IATR[facility]  Chief Complaint   Patient presents with    RECHECK     HPI:  Luc Nguyen is a 89 year old  (4/16/1932), who is being seen today for an episodic care visit at: [facility] (TCU) [[id number]].     Background:    This is a 89-year-old male, with a past medical history significant for coronary artery disease, severe aortic stenosis, and COPD, who was admitted to [facility] 7/15/23 through 7/19/23 for chest pain. Found to have NSTEMI. Declined invasive procedures. PTA Tamsulosin and Isosorbide Mononitrate discontinue due to hypotension. Started on Gabapentin for neuropathy. Patient and family elected to try a TCU stay with plans to enroll in hospice if further decline.     Today's concern is:     Constipation. Today, patient reports he hasn't had a bowel movement in 4 days. No abdominal pain. Upon review of documentation, has had 5 bowel movements in the past 5 days.     Recent NSTEMI 7/15/23 with Coronary Artery Disease, Hyperlipidemia, Hypertension, and Severe Aortic Stenosis. Upon review of blood pressures over the past 5 days, systolic range from . Diastolic range from 53-66.    Allergies, and PMH/PSH reviewed in EPIC today.  REVIEW OF SYSTEMS:  4 point ROS including Respiratory, CV, GI and , other than that noted in the HPI,  is negative    Objective:   /64   Pulse 82   Temp 97.6  F (36.4  C)   Resp 18   Ht 1.753 m (5' 9\")   Wt 69.4 kg (152 lb 14.4 oz)   SpO2 98%   BMI 22.58 kg/m    GENERAL APPEARANCE:  Alert, in no distress  ENT:  Mouth and posterior oropharynx normal, moist mucous membranes  EYES:  EOM, conjunctivae, lids, pupils and irises normal  RESP:  no respiratory distress  ABDOMEN:  normal bowel sounds, soft, nontender, no hepatosplenomegaly or other masses  PSYCH:  oriented to person    Labs done in SNF are in [facility]. Please refer to them using EPIC/Care Everywhere.    Assessment/Plan:  Constipation. Patient reports not having a bowel " movement in 4 days, but documentation suggests more regular bowel movements. As patient is a poor historian secondary to cognitive impairment, will continue Miralax as ordered.    Recent NSTEMI 7/15/23 with Coronary Artery Disease, Hyperlipidemia, Hypertension, and Severe Aortic Stenosis. Blood pressures soft, but stable.  Continue Aspirin and Simvastatin as ordered for now. Hospice to review medications upon enrollment and reduce polypharmacy    Neuropathy. Continue Diclofenac and Gabapentin as ordered. Gabapentin last increased 7/28/23.    Chronic Anemia. Recent baseline Hemoglobin ~ 11. Last Hemoglobin 13.1 on 7/24/23. Will no longer check labs given planned hospice enrollment.     Benign Prostatic Hypertrophy. Previously on Tamsulosin, but this was discontinued due to hypotension.    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 Incidental finding on 2/16/23 chest CT. Has not yet seen Pulmonology due to previous hospice enrollment. Follow-up as indicated depending upon goals of care.    Cognitive Impairment. SLUMS 16/30 and ACL 3.8/5.8. Lived home alone PTA.  to assist with appropriate discharge disposition.     Physical Deconditioning. Secondary to recent hospitalization and co-morbidities. Completed Occupational Therapy. Continue Physical Therapy. Tentative plan is to discharge from therapy 8/7/23 with transition to [facility] 8/8/23.    Orders:  None    Electronically signed by: LAVINIA Harvey CNP

## 2023-08-11 NOTE — LETTER
"    8/11/2023        RE: Luc Nguyen  [address] [place] [address]        [facility] GERIATRICS  Chief Complaint   Patient presents with     RECHECK     HPI:  Luc Nguyen is a 89 year old  (4/16/1932), who is being seen today for an episodic care visit at: [facility] (U) [[id number]].     Background:    This is a 89-year-old male, with a past medical history significant for coronary artery disease, severe aortic stenosis, and COPD, who was admitted to [facility] 7/15/23 through 7/19/23 for chest pain. Found to have NSTEMI. Declined invasive procedures. PTA Tamsulosin and Isosorbide Mononitrate discontinue due to hypotension. Started on Gabapentin for neuropathy. Patient and family elected to try a TCU stay with plans to enroll in hospice if further decline.      Today's concern is:     Today, patient is sitting at his bedside table eating lunch. Has no concerns.     Hypertension. Upon review of blood pressures over the past 5 days, systolic range from 108-134. Diastolic range from 57-67.    Constipation. Upon review of documentation, has had 2 bowel movements in the past 5 days.    Allergies, and PMH/PSH reviewed in EPIC today.  REVIEW OF SYSTEMS:  4 point ROS including Respiratory, CV, GI and , other than that noted in the HPI,  is negative    Objective:   /58   Pulse 72   Temp 97.7  F (36.5  C)   Resp 16   Ht 1.753 m (5' 9\")   Wt 69.6 kg (153 lb 8 oz)   SpO2 92%   BMI 22.67 kg/m    GENERAL APPEARANCE:  Alert, in no distress  ENT:  Mouth and posterior oropharynx normal, moist mucous membranes  EYES:  EOM, conjunctivae, lids, pupils and irises normal  RESP:  respiratory effort and palpation of chest normal, lungs clear to auscultation , no respiratory distress  CV:  Palpation and auscultation of heart done , Murmur present  PSYCH:  memory impaired     Labs done in SNF are in [facility]. Please refer to them using EPIC/Care " Everywhere.    Assessment/Plan:  Constipation. Stable on Miralax as ordered.     Recent NSTEMI 7/15/23 with Coronary Artery Disease, Hyperlipidemia, Hypertension, and Severe Aortic Stenosis. Continue Aspirin and Simvastatin as ordered for now. Hospice to review medications upon enrollment and reduce polypharmacy     Neuropathy. Continue Diclofenac and Gabapentin as ordered. Gabapentin last increased 7/28/23.     Chronic Anemia. Recent baseline Hemoglobin ~ 11. Last Hemoglobin 13.1 on 7/24/23. Will no longer check labs given planned hospice enrollment.      Benign Prostatic Hypertrophy. Previously on Tamsulosin, but this was discontinued due to hypotension.     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 Incidental finding on 2/16/23 chest CT. Follow-up as indicated depending upon goals of care.     Cognitive Impairment. SLUMS 16/30 and ACL 3.8/5.8. Lived home alone PTA. Will move to LTC when therapy is complete.    Chronic Obstructive Pulmonary Disease. Continue Albuterol nebulizer as ordered.     Physical Deconditioning. Secondary to recent hospitalization and co-morbidities. Completed Occupational Therapy. Continue Physical Therapy. Tentative plan is to discharge from therapy with transition to [facility] 8/15/23.    Orders:  None    Electronically signed by: LAVINIA Harvey CNP          Sincerely,        LAVINIA Harvey CNP

## 2023-08-11 NOTE — PROGRESS NOTES
"[facility] GERIATRICS  Chief Complaint   Patient presents with    RECHECK     HPI:  Luc Nguyen is a 89 year old  (4/16/1932), who is being seen today for an episodic care visit at: [facility] (TCU) [[id number]].     Background:    This is a 89-year-old male, with a past medical history significant for coronary artery disease, severe aortic stenosis, and COPD, who was admitted to [facility] 7/15/23 through 7/19/23 for chest pain. Found to have NSTEMI. Declined invasive procedures. PTA Tamsulosin and Isosorbide Mononitrate discontinue due to hypotension. Started on Gabapentin for neuropathy. Patient and family elected to try a TCU stay with plans to enroll in hospice if further decline.      Today's concern is:     Today, patient is sitting at his bedside table eating lunch. Has no concerns.     Hypertension. Upon review of blood pressures over the past 5 days, systolic range from 108-134. Diastolic range from 57-67.    Constipation. Upon review of documentation, has had 2 bowel movements in the past 5 days.    Allergies, and PMH/PSH reviewed in EPIC today.  REVIEW OF SYSTEMS:  4 point ROS including Respiratory, CV, GI and , other than that noted in the HPI,  is negative    Objective:   /58   Pulse 72   Temp 97.7  F (36.5  C)   Resp 16   Ht 1.753 m (5' 9\")   Wt 69.6 kg (153 lb 8 oz)   SpO2 92%   BMI 22.67 kg/m    GENERAL APPEARANCE:  Alert, in no distress  ENT:  Mouth and posterior oropharynx normal, moist mucous membranes  EYES:  EOM, conjunctivae, lids, pupils and irises normal  RESP:  respiratory effort and palpation of chest normal, lungs clear to auscultation , no respiratory distress  CV:  Palpation and auscultation of heart done , Murmur present  PSYCH:  memory impaired     Labs done in SNF are in [facility]. Please refer to them using [facility]/Care Everywhere.    Assessment/Plan:  Constipation. Stable on Miralax as ordered.     Recent NSTEMI 7/15/23 with Coronary Artery " Disease, Hyperlipidemia, Hypertension, and Severe Aortic Stenosis. Continue Aspirin and Simvastatin as ordered for now. Hospice to review medications upon enrollment and reduce polypharmacy     Neuropathy. Continue Diclofenac and Gabapentin as ordered. Gabapentin last increased 7/28/23.     Chronic Anemia. Recent baseline Hemoglobin ~ 11. Last Hemoglobin 13.1 on 7/24/23. Will no longer check labs given planned hospice enrollment.      Benign Prostatic Hypertrophy. Previously on Tamsulosin, but this was discontinued due to hypotension.     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 Incidental finding on 2/16/23 chest CT. Follow-up as indicated depending upon goals of care.     Cognitive Impairment. SLUMS 16/30 and ACL 3.8/5.8. Lived home alone PTA. Will move to LTC when therapy is complete.    Chronic Obstructive Pulmonary Disease. Continue Albuterol nebulizer as ordered.     Physical Deconditioning. Secondary to recent hospitalization and co-morbidities. Completed Occupational Therapy. Continue Physical Therapy. Tentative plan is to discharge from therapy with transition to [facility] 8/15/23.    Orders:  None    Electronically signed by: LAVINIA Harvey CNP

## 2023-08-23 NOTE — LETTER
8/23/2023        RE: Luc Nguyen  [address] [place] [address]            M [facility] GERIATRICS  INITIAL VISIT NOTE  August 23, 2023    PRIMARY CARE PROVIDER AND CLINIC: Roxane Cuevas [address] [facility] / [place] [address]    M [facility] Medical Record Number: [id number]  Place of Service where encounter took place: [facility] () [[id number]]    Chief Complaint   Patient presents with     \[facility]\"" Care     HPI:    Luc Nguyen is a 89 year old (4/16/1932) male was admitted to the above facility from [facility]. Hospital stay 7/15/23 through 7/19/23 where they were admitted for NSTEMI. Now admitted to this facility for medical management, nursing care, and hospice.      History obtained from: facility chart records, facility staff, patient report, [facility] chart review, Care Everywhere Epic chart review, and family/first contact daughter report.      Recently transferred from TCU to LTC. He was admitted after hospitalization for NSTEMI. He had declined invasive procedure. After he completed therapy, he opted to pursue hospice cares so was transferred to LTC. He is currently on [facility] hospice. Daughter is present on visit today. He has been coughing some, but denies and SOB. He does feel the nebs help him breath better. He has not noticed any wheezing. Has not had an recent chest pain. He denies any pain, but his feet do bother him at times. He reports he had a bitter tasting pill yesterday evening, but says it did get stuck in his mouth even though he took it with apple sauce. No issues with medications this AM. Reviewed medications with daughter - she is ok stopped the atorvastatin given goals of care to reduce risk of side effects and reduce pill burden. Appetite has been good, he ate several cookies this morning before breakfast. He is having bowel movements.      CODE STATUS/ADVANCE DIRECTIVES: DNR / DNI    ALLERGIES:  No Known Allergies          SOCIAL  "HISTORY:   Patient's living condition: lives in a skilled nursing facility    MEDICATIONS  Post Discharge Medication Reconciliation Status: discharge medications reconciled and changed, per note/orders.  Current Outpatient Medications   Medication Sig Dispense Refill     acetaminophen (TYLENOL) 500 MG tablet Take 1,000 mg by mouth 2 times daily +650mg PO BID PRN       albuterol (ACCUNEB) 0.63 MG/3ML neb solution Take 1 ampule by nebulization 2 times daily May also take BID PRN       aspirin (ASA) 81 MG chewable tablet Take 81 mg by mouth daily       carbamide peroxide (DEBROX) 6.5 % otic solution Place 5 drops into both ears 2 times daily as needed for other       diclofenac (VOLTAREN) 1 % topical gel Apply topically 4 times daily as needed for moderate pain       gabapentin (NEURONTIN) 100 MG capsule 200 mg in am and 400 mg at HS       guaiFENesin (ORGANIDIN) 200 MG tablet Take 400 mg by mouth every 4 hours as needed for cough       magnesium sulfate (EPSOM SALT) GRAN granules Apply topically At Bedtime Apply to warm water for foot soak topically at bedtime for pain in bilateral feet       naloxone (NARCAN) 0.4 MG/ML injection Inject 0.1-0.4 mg into the vein ) Inject 1 ml intramuscularly as needed  for opioid overdose Give 1 ml as needed for opioid  overdose. May give 1 ml in 2-3 minutes if patient is  still unresponsive       polyethylene glycol (MIRALAX) 17 g packet Take 17 g by mouth daily as needed for constipation       ROS:  10 point ROS neg other than the symptoms noted above in the HPI.      PHYSICAL EXAM:  /63   Pulse 67   Temp 98.3  F (36.8  C)   Resp 16   Ht 1.753 m (5' 9\")   Wt 72.5 kg (159 lb 12.8 oz)   SpO2 92%   BMI 23.60 kg/m    Physical Exam  Cardiovascular:      Rate and Rhythm: Normal rate and regular rhythm.      Heart sounds: Normal heart sounds.   Pulmonary:      Effort: Pulmonary effort is normal.      Comments: Faint expiratory wheezing, occ coughing  Abdominal:      General: " Bowel sounds are normal.   Musculoskeletal:      Right lower leg: Edema (2 foot/ankle) present.      Left lower leg: Edema (2+ foot/ankle) present.   Neurological:      General: No focal deficit present.      Mental Status: He is alert.   Psychiatric:         Mood and Affect: Mood normal.      Comments: Forgetful, judgement fair          LABORATORY/IMAGING DATA:  Reviewed as per [facility] and/or [facility]    ASSESSMENT/PLAN:  Coronary artery disease of native artery of native heart with stable angina pectoris (H)  Nonrheumatic aortic valve stenosis  Severe aortic stenosis  NSTEMI (non-ST elevated myocardial infarction) (H)  Hospice care patient  Mixed hyperlipidemia  Recent NSTEMI, declined invasive interventions. Recent Echo with EF of >70%. He is currently on hospice. Reviewed medications with patient and daughter. They are agreeable to stop statin given goals of care, reduce side effect and reduce pill burden.   - continue current hospice cares, appreciate their assistance.   - morphine PRN  - discontinue simvastatin  - will continue ASA for now, but will consider stopping in the future (daughter states she would be ok with stopping)  - monitor and adjust       Pulmonary arteriovenous malformation  Noted on CT, given goals of care will not pursue this    Constipation, unspecified constipation type  Reports + BM  - continue miralax PRN, supp PRN, senna PRN  - monitor and adjust     Moderate chronic obstructive pulmonary disease (H)  Mild wheezing on exam  - continue nebs   - supportive cares/comfort meds    Neuropathy  Reviewed with patient, daughter, and nursing staff. Does have some discomfort, more so at night  - continue gabapentin 200mg q AM and 400mg at bedtime  - apply pressure reduction boot at bedtime to  improve comfort at night    Moderate dementia without behavioral disturbance, psychotic disturbance, mood disturbance, or anxiety, unspecified dementia type (H)  SLUMS 16/30, ACLS 3.8/5.8. Appropriate  for LTC and 24/7 supervision recommended.   - continue supportive cares,       Orders:   Discontinue simvastatin, MVI    Total time spent with patient visit at the skilled nursing facility was 45 min including patient visit and review of past records. Total time spent reviewing records from hospitalization outside my organization,  review of TCU provider notes from within my organization, review of facility records, medication reconciliation, discussion of plan of care with nursing staff, time spent on documentation as well as discussion with patient including review of medications, discussion of plan of care and patient education as stated above with patient and daughter       Electronically signed by:  Iman Day       Sincerely,        LAVINIA Rosenbaum CNP

## 2023-08-23 NOTE — PROGRESS NOTES
[facility] GERIATRICS  INITIAL VISIT NOTE  August 23, 2023    PRIMARY CARE PROVIDER AND CLINIC: Roxane Cuevas [address] [facility] [address]    [facility] Medical Record Number: [id number]  Place of Service where encounter took place: [facility] () [[id number]]    Chief Complaint   Patient presents with    Establish Care     HPI:    Luc Nguyen is a 89 year old (4/16/1932) male was admitted to the above facility from [facility]. Hospital stay 7/15/23 through 7/19/23 where they were admitted for NSTEMI. Now admitted to this facility for medical management, nursing care, and hospice.      History obtained from: facility chart records, facility staff, patient report, [facility] chart review, Care Everywhere Epic chart review, and family/first contact daughter report.      Recently transferred from TCU to LTC. He was admitted after hospitalization for NSTEMI. He had declined invasive procedure. After he completed therapy, he opted to pursue hospice cares so was transferred to LTC. He is currently on [facility] hospice. Daughter is present on visit today. He has been coughing some, but denies and SOB. He does feel the nebs help him breath better. He has not noticed any wheezing. Has not had an recent chest pain. He denies any pain, but his feet do bother him at times. He reports he had a bitter tasting pill yesterday evening, but says it did get stuck in his mouth even though he took it with apple sauce. No issues with medications this AM. Reviewed medications with daughter - she is ok stopped the atorvastatin given goals of care to reduce risk of side effects and reduce pill burden. Appetite has been good, he ate several cookies this morning before breakfast. He is having bowel movements.      CODE STATUS/ADVANCE DIRECTIVES: DNR / DNI    ALLERGIES:  No Known Allergies          SOCIAL HISTORY:   Patient's living condition: lives in a skilled nursing  "facility    MEDICATIONS  Post Discharge Medication Reconciliation Status: discharge medications reconciled and changed, per note/orders.  Current Outpatient Medications   Medication Sig Dispense Refill    acetaminophen (TYLENOL) 500 MG tablet Take 1,000 mg by mouth 2 times daily +650mg PO BID PRN      albuterol (ACCUNEB) 0.63 MG/3ML neb solution Take 1 ampule by nebulization 2 times daily May also take BID PRN      aspirin (ASA) 81 MG chewable tablet Take 81 mg by mouth daily      carbamide peroxide (DEBROX) 6.5 % otic solution Place 5 drops into both ears 2 times daily as needed for other      diclofenac (VOLTAREN) 1 % topical gel Apply topically 4 times daily as needed for moderate pain      gabapentin (NEURONTIN) 100 MG capsule 200 mg in am and 400 mg at HS      guaiFENesin (ORGANIDIN) 200 MG tablet Take 400 mg by mouth every 4 hours as needed for cough      magnesium sulfate (EPSOM SALT) GRAN granules Apply topically At Bedtime Apply to warm water for foot soak topically at bedtime for pain in bilateral feet      naloxone (NARCAN) 0.4 MG/ML injection Inject 0.1-0.4 mg into the vein ) Inject 1 ml intramuscularly as needed  for opioid overdose Give 1 ml as needed for opioid  overdose. May give 1 ml in 2-3 minutes if patient is  still unresponsive      polyethylene glycol (MIRALAX) 17 g packet Take 17 g by mouth daily as needed for constipation       ROS:  10 point ROS neg other than the symptoms noted above in the HPI.      PHYSICAL EXAM:  /63   Pulse 67   Temp 98.3  F (36.8  C)   Resp 16   Ht 1.753 m (5' 9\")   Wt 72.5 kg (159 lb 12.8 oz)   SpO2 92%   BMI 23.60 kg/m    Physical Exam  Cardiovascular:      Rate and Rhythm: Normal rate and regular rhythm.      Heart sounds: Normal heart sounds.   Pulmonary:      Effort: Pulmonary effort is normal.      Comments: Faint expiratory wheezing, occ coughing  Abdominal:      General: Bowel sounds are normal.   Musculoskeletal:      Right lower leg: Edema (2 " foot/ankle) present.      Left lower leg: Edema (2+ foot/ankle) present.   Neurological:      General: No focal deficit present.      Mental Status: He is alert.   Psychiatric:         Mood and Affect: Mood normal.      Comments: Forgetful, judgement fair          LABORATORY/IMAGING DATA:  Reviewed as per [facility] and/or [facility]    ASSESSMENT/PLAN:  Coronary artery disease of native artery of native heart with stable angina pectoris (H)  Nonrheumatic aortic valve stenosis  Severe aortic stenosis  NSTEMI (non-ST elevated myocardial infarction) (H)  Hospice care patient  Mixed hyperlipidemia  Recent NSTEMI, declined invasive interventions. Recent Echo with EF of >70%. He is currently on hospice. Reviewed medications with patient and daughter. They are agreeable to stop statin given goals of care, reduce side effect and reduce pill burden.   - continue current hospice cares, appreciate their assistance.   - morphine PRN  - discontinue simvastatin  - will continue ASA for now, but will consider stopping in the future (daughter states she would be ok with stopping)  - monitor and adjust       Pulmonary arteriovenous malformation  Noted on CT, given goals of care will not pursue this    Constipation, unspecified constipation type  Reports + BM  - continue miralax PRN, supp PRN, senna PRN  - monitor and adjust     Moderate chronic obstructive pulmonary disease (H)  Mild wheezing on exam  - continue nebs   - supportive cares/comfort meds    Neuropathy  Reviewed with patient, daughter, and nursing staff. Does have some discomfort, more so at night  - continue gabapentin 200mg q AM and 400mg at bedtime  - apply pressure reduction boot at bedtime to  improve comfort at night    Moderate dementia without behavioral disturbance, psychotic disturbance, mood disturbance, or anxiety, unspecified dementia type (H)  SLUMS 16/30, ACLS 3.8/5.8. Appropriate for LTC and 24/7 supervision recommended.   - continue supportive cares,        Orders:   Discontinue simvastatin, MVI    Total time spent with patient visit at the skilled nursing facility was 45 min including patient visit and review of past records. Total time spent reviewing records from hospitalization outside my organization,  review of TCU provider notes from within my organization, review of facility records, medication reconciliation, discussion of plan of care with nursing staff, time spent on documentation as well as discussion with patient including review of medications, discussion of plan of care and patient education as stated above with patient and daughter       Electronically signed by:  Iman Day

## 2023-09-08 NOTE — PROGRESS NOTES
[facility] GERIATRICS  Chief Complaint   [id number]  Place of Service where encounter took place:  [facility] () [[id number]]    HPI:    Luc Nguyen  is 89 year old (4/16/1932), who is being seen today for a federally mandated E/M visit.     Today's concerns are:   - Resident seen and examined, chart reviewed and discussed with the nursing staff.   -  COPD: reports cough daily, brings up whitish phlegm. Denies wheezing, or SOB  - Heart: denies chest pain.   - [facility] [place] [facility]:  - DNP and RN have no concern today.   --------------------------------    MEDICATIONS:  MED REC REQUIRED  Post Medication Reconciliation Status: medication reconcilation previously completed during another office visit       Review of your medicines            Accurate as of September 11, 2023  9:30 AM. If you have any questions, ask your nurse or doctor.                CONTINUE these medicines which have NOT CHANGED        Dose / Directions   acetaminophen 500 MG tablet  Commonly known as: TYLENOL      Dose: 1,000 mg  Take 1,000 mg by mouth 2 times daily +650mg PO BID PRN  Refills: 0     albuterol 0.63 MG/3ML neb solution  Commonly known as: ACCUNEB      Dose: 1 ampule  Take 1 ampule by nebulization 2 times daily May also take BID PRN  Refills: 0     aspirin 81 MG chewable tablet  Commonly known as: ASA      Dose: 81 mg  Take 81 mg by mouth daily  Refills: 0     bisacodyl 10 MG suppository  Commonly known as: DULCOLAX      Dose: 10 mg  Place 1 suppository (10 mg) rectally daily as needed for constipation  Refills: 0     carbamide peroxide 6.5 % otic solution  Commonly known as: DEBROX      Dose: 5 drop  Place 5 drops into both ears 2 times daily as needed for other  Refills: 0     diclofenac 1 % topical gel  Commonly known as: VOLTAREN      Apply topically 4 times daily as needed for moderate pain  Refills: 0     gabapentin 100 MG  "capsule  Commonly known as: NEURONTIN      200 mg in am and 400 mg at HS  Refills: 0     guaiFENesin 200 MG tablet  Commonly known as: ORGANIDIN      Dose: 400 mg  Take 400 mg by mouth every 4 hours as needed for cough  Refills: 0     hyoscyamine 0.125 MG sublingual tablet  Commonly known as: LEVSIN/SL      Dose: 0.125 mg  Place 1 tablet (0.125 mg) under the tongue every 4 hours as needed for cramping  Refills: 0     LORazepam 0.5 MG tablet  Commonly known as: ATIVAN      Dose: 0.25 mg  Take 0.5 tablets (0.25 mg) by mouth every 2 hours as needed for anxiety  Refills: 0     magnesium sulfate Gran granules  Commonly known as: EPSOM SALT      Apply topically At Bedtime Apply to warm water for foot soak topically at bedtime for pain in bilateral feet  Refills: 0     morphine 10 MG/ML injection      Dose: 5 mg  Inject 5 mg into the vein every 2 hours as needed for severe pain  Refills: 0     naloxone 0.4 MG/ML injection  Commonly known as: NARCAN      Dose: 0.1-0.4 mg  Inject 0.1-0.4 mg into the vein ) Inject 1 ml intramuscularly as needed  for opioid overdose Give 1 ml as needed for opioid  overdose. May give 1 ml in 2-3 minutes if patient is  still unresponsive  Refills: 0     polyethylene glycol 17 g packet  Commonly known as: MIRALAX      Dose: 17 g  Take 17 g by mouth daily as needed for constipation  Refills: 0           Case Management:  I have reviewed the care plan and MDS and do agree with the plan. Patient's desire to return to the community is currently living in a community environment. Information reviewed:  Medications, vital signs, orders, and nursing notes.    ROS:  4 point ROS including Respiratory, CV, GI and , other than that noted in the HPI,  is negative    Vitals:  /64   Pulse 69   Temp 97.5  F (36.4  C)   Resp 20   Ht 1.753 m (5' 9\")   Wt 70 kg (154 lb 6.4 oz)   SpO2 97%   BMI 22.80 kg/m    Body mass index is 22.8 kg/m .  Exam:   GENERAL APPEARANCE:  in no distress,   RESP:  " Unlabored breathing. CTA b/l.   CV:  S1S2 audible, regular HR, 3/6 systolic murmur appreciated.  Over right side. 1+pitting pedal edema.   ABDOMEN:  soft, NT/ND, BS audible.   M/S:   no joint deformity noted on observation.   SKIN:  No rash noted on observation  NEURO:   No NFD appreciated on observation.   PSYCH:  affect and mood normal    Lab/Diagnostic data: Reviewed in the chart and EHR.        ASSESSMENT/PLAN  ----------------------------   (J44.9) Moderate chronic obstructive pulmonary disease (H)   - appears stable today.       (G62.9) Neuropathy  - feet, worse at night, currently on gabapentin 200 mg bid. Will increase night dose to 400 mg, keep day time as is.   - assess in one week.     Slow transit constipation:  - on regimen, no  concern.       (I21.4) NSTEMI (non-ST elevated myocardial infarction) (H)  (primary encounter diagnosis)  (I25.118) Coronary artery disease of native artery of native heart with stable angina pectoris (H)  (Q25.72) Pulmonary arteriovenous malformation  (I35.0) Nonrheumatic aortic valve stenosis  (I35.0) Severe aortic stenosis  (E78.2) Mixed hyperlipidemia  (Z51.5) Hospice care, [place]  - appears stable today.   - appetite is fine.   - analgesia is optimal with the current regimen.   - Appreciate collaboration with Hospice Team for symptom management in collaboration with cares for maximum comfort at end-of-life      Electronically signed by:  Tesfaye Boggs MD

## 2023-09-11 NOTE — LETTER
9/11/2023        RE: Luc Nguyen  [address] [place] [address]        [facility] GERIATRICS  Chief Complaint   Patient presents with     [facility] Medical Record Number:  [id number]  Place of Service where encounter took place:  [facility] () [[id number]]    HPI:    Luc Nguyen  is 89 year old (4/16/1932), who is being seen today for a federally mandated E/M visit.     Today's concerns are:   - Resident seen and examined, chart reviewed and discussed with the nursing staff.   -  COPD: reports cough daily, brings up whitish phlegm. Denies wheezing, or SOB  - Heart: denies chest pain.   - [facility]:  - DNP and RN have no concern today.   --------------------------------    MEDICATIONS:  MED REC REQUIRED  Post Medication Reconciliation Status: medication reconcilation previously completed during another office visit       Review of your medicines            Accurate as of September 11, 2023  9:30 AM. If you have any questions, ask your nurse or doctor.                CONTINUE these medicines which have NOT CHANGED        Dose / Directions   acetaminophen 500 MG tablet  Commonly known as: TYLENOL      Dose: 1,000 mg  Take 1,000 mg by mouth 2 times daily +650mg PO BID PRN  Refills: 0     albuterol 0.63 MG/3ML neb solution  Commonly known as: ACCUNEB      Dose: 1 ampule  Take 1 ampule by nebulization 2 times daily May also take BID PRN  Refills: 0     aspirin 81 MG chewable tablet  Commonly known as: ASA      Dose: 81 mg  Take 81 mg by mouth daily  Refills: 0     bisacodyl 10 MG suppository  Commonly known as: DULCOLAX      Dose: 10 mg  Place 1 suppository (10 mg) rectally daily as needed for constipation  Refills: 0     carbamide peroxide 6.5 % otic solution  Commonly known as: DEBROX      Dose: 5 drop  Place 5 drops into both ears 2 times daily as needed for other  Refills: 0     diclofenac 1 % topical gel  Commonly known as: VOLTAREN      Apply topically  "4 times daily as needed for moderate pain  Refills: 0     gabapentin 100 MG capsule  Commonly known as: NEURONTIN      200 mg in am and 400 mg at HS  Refills: 0     guaiFENesin 200 MG tablet  Commonly known as: ORGANIDIN      Dose: 400 mg  Take 400 mg by mouth every 4 hours as needed for cough  Refills: 0     hyoscyamine 0.125 MG sublingual tablet  Commonly known as: LEVSIN/SL      Dose: 0.125 mg  Place 1 tablet (0.125 mg) under the tongue every 4 hours as needed for cramping  Refills: 0     LORazepam 0.5 MG tablet  Commonly known as: ATIVAN      Dose: 0.25 mg  Take 0.5 tablets (0.25 mg) by mouth every 2 hours as needed for anxiety  Refills: 0     magnesium sulfate Gran granules  Commonly known as: EPSOM SALT      Apply topically At Bedtime Apply to warm water for foot soak topically at bedtime for pain in bilateral feet  Refills: 0     morphine 10 MG/ML injection      Dose: 5 mg  Inject 5 mg into the vein every 2 hours as needed for severe pain  Refills: 0     naloxone 0.4 MG/ML injection  Commonly known as: NARCAN      Dose: 0.1-0.4 mg  Inject 0.1-0.4 mg into the vein ) Inject 1 ml intramuscularly as needed  for opioid overdose Give 1 ml as needed for opioid  overdose. May give 1 ml in 2-3 minutes if patient is  still unresponsive  Refills: 0     polyethylene glycol 17 g packet  Commonly known as: MIRALAX      Dose: 17 g  Take 17 g by mouth daily as needed for constipation  Refills: 0           Case Management:  I have reviewed the care plan and MDS and do agree with the plan. Patient's desire to return to the community is currently living in a community environment. Information reviewed:  Medications, vital signs, orders, and nursing notes.    ROS:  4 point ROS including Respiratory, CV, GI and , other than that noted in the HPI,  is negative    Vitals:  /64   Pulse 69   Temp 97.5  F (36.4  C)   Resp 20   Ht 1.753 m (5' 9\")   Wt 70 kg (154 lb 6.4 oz)   SpO2 97%   BMI 22.80 kg/m    Body mass index " is 22.8 kg/m .  Exam:   GENERAL APPEARANCE:  in no distress,   RESP:  Unlabored breathing. CTA b/l.   CV:  S1S2 audible, regular HR, 3/6 systolic murmur appreciated.  Over right side. 1+pitting pedal edema.   ABDOMEN:  soft, NT/ND, BS audible.   M/S:   no joint deformity noted on observation.   SKIN:  No rash noted on observation  NEURO:   No NFD appreciated on observation.   PSYCH:  affect and mood normal    Lab/Diagnostic data: Reviewed in the chart and EHR.        ASSESSMENT/PLAN  ----------------------------   (J44.9) Moderate chronic obstructive pulmonary disease (H)   - appears stable today.       (G62.9) Neuropathy  - feet, worse at night, currently on gabapentin 200 mg bid. Will increase night dose to 400 mg, keep day time as is.   - assess in one week.     Slow transit constipation:  - on regimen, no  concern.       (I21.4) NSTEMI (non-ST elevated myocardial infarction) (H)  (primary encounter diagnosis)  (I25.118) Coronary artery disease of native artery of native heart with stable angina pectoris (H)  (Q25.72) Pulmonary arteriovenous malformation  (I35.0) Nonrheumatic aortic valve stenosis  (I35.0) Severe aortic stenosis  (E78.2) Mixed hyperlipidemia  (Z51.5) [facility], [place] [facility]  - appears stable today.   - appetite is fine.   - analgesia is optimal with the current regimen.   - Appreciate collaboration with Hospice Team for symptom management in collaboration with cares for maximum comfort at end-of-life      Electronically signed by:  Tesfaye Boggs MD            Sincerely,        Tesfaye Boggs MD

## 2023-11-07 NOTE — PROGRESS NOTES
[facility] GERIATRICS  Chief Complaint   Patient presents with    long-term Regulatory     [facility] Medical Record Number:  [id number]  Place of Service where encounter took place:  [facility] () [[id number]]    HPI:    Luc Nguyen  is 89 year old (4/16/1932), who is being seen today for a federally mandated E/M visit. Today's concerns are:  Patient feeling good and without concerns.   -Seen today for a regulatory appointment.   -Denies SOB, lightheadedness and CP    BP Readings from Last 3 Encounters:   11/07/23 135/60   09/11/23 136/64   08/23/23 116/63     Pulse Readings from Last 4 Encounters:   11/07/23 68   09/11/23 69   08/23/23 67   08/11/23 72     Wt Readings from Last 4 Encounters:   11/07/23 73.3 kg (161 lb 8 oz)   09/11/23 70 kg (154 lb 6.4 oz)   08/23/23 72.5 kg (159 lb 12.8 oz)   08/11/23 69.6 kg (153 lb 8 oz)     ALLERGIES:Patient has no known allergies.  PAST MEDICAL HISTORY: History reviewed. No pertinent past medical history.  PAST SURGICAL HISTORY:   has no past surgical history on file.  FAMILY HISTORY: family history is not on file.  SOCIAL HISTORY:      MEDICATIONS:  MED REC REQUIRED  Post Medication Reconciliation Status: patient was not discharged from an inpatient facility or TCU         Review of your medicines            Accurate as of November 7, 2023  9:36 AM. If you have any questions, ask your nurse or doctor.                CONTINUE these medicines which have NOT CHANGED        Dose / Directions   acetaminophen 500 MG tablet  Commonly known as: TYLENOL      Dose: 1,000 mg  Take 1,000 mg by mouth 2 times daily +650mg PO BID PRN  Refills: 0     albuterol 0.63 MG/3ML neb solution  Commonly known as: ACCUNEB      Dose: 1 ampule  Take 1 ampule by nebulization 2 times daily May also take BID PRN  Refills: 0     aspirin 81 MG chewable tablet  Commonly known as: ASA      Dose: 81 mg  Take 81 mg by mouth daily  Refills: 0     bisacodyl 10 MG suppository  Commonly known as:  DULCOLAX      Dose: 10 mg  Place 1 suppository (10 mg) rectally daily as needed for constipation  Refills: 0     carbamide peroxide 6.5 % otic solution  Commonly known as: DEBROX      Dose: 5 drop  Place 5 drops into both ears 2 times daily as needed for other  Refills: 0     diclofenac 1 % topical gel  Commonly known as: VOLTAREN      Apply topically 4 times daily as needed for moderate pain  Refills: 0     gabapentin 100 MG capsule  Commonly known as: NEURONTIN      200 mg in am and 400 mg at HS  Refills: 0     guaiFENesin 200 MG tablet  Commonly known as: ORGANIDIN      Dose: 400 mg  Take 400 mg by mouth every 4 hours as needed for cough  Refills: 0     hyoscyamine 0.125 MG sublingual tablet  Commonly known as: LEVSIN/SL      Dose: 0.125 mg  Place 1 tablet (0.125 mg) under the tongue every 4 hours as needed for cramping  Refills: 0     LORazepam 0.5 MG tablet  Commonly known as: ATIVAN      Dose: 0.25 mg  Take 0.5 tablets (0.25 mg) by mouth every 2 hours as needed for anxiety  Refills: 0     magnesium sulfate Gran granules  Commonly known as: EPSOM SALT      Apply topically At Bedtime Apply to warm water for foot soak topically at bedtime for pain in bilateral feet  Refills: 0     morphine 10 MG/ML injection      Dose: 5 mg  Inject 5 mg into the vein every 2 hours as needed for severe pain  Refills: 0     naloxone 0.4 MG/ML injection  Commonly known as: NARCAN      Dose: 0.1-0.4 mg  Inject 0.1-0.4 mg into the vein ) Inject 1 ml intramuscularly as needed  for opioid overdose Give 1 ml as needed for opioid  overdose. May give 1 ml in 2-3 minutes if patient is  still unresponsive  Refills: 0     polyethylene glycol 17 g packet  Commonly known as: MIRALAX      Dose: 17 g  Take 17 g by mouth daily as needed for constipation  Refills: 0               Case Management:  I have reviewed the care plan and MDS and do agree with the plan. Patient's desire to return to the community is not present. Information reviewed:   "Medications, vital signs, orders, and nursing notes.    ROS:  4 point ROS including Respiratory, CV, GI and , other than that noted in the HPI,  is negative    Vitals:  /60   Pulse 68   Temp 97.6  F (36.4  C)   Resp 18   Ht 1.753 m (5' 9\")   Wt 73.3 kg (161 lb 8 oz)   SpO2 94%   BMI 23.85 kg/m    Body mass index is 23.85 kg/m .  Exam:  A & O x 3, NAD. Lungs CTA, non labored. RRR, S1/S2 w/o murmur,gallop or rub.  No edema.  Abdomen soft, nontender, +BT'S. No focal neurological deficits.        Lab/Diagnostic data:   Recent labs in [facility] reviewed by me today.     ASSESSMENT/PLAN  Nonrheumatic aortic valve stenosis  Enrolled with [facility] with goals of care directed at pain control and comfort.     Constipation, unspecified constipation type  Chronic, stable.   Continue with plan of care no changes at this time, adjustment as needed    Moderate dementia without behavioral disturbance, psychotic disturbance, mood disturbance, or anxiety, unspecified dementia type (H)  Chronic, progressive. BIMS assessment 7/15 indicating moderate cognitive impairment.   -Continue LTC support with medication administration, meals and safety. Expect further decline with progression of diease.         Electronically signed by:  Bre Batista NP         "

## 2023-11-07 NOTE — LETTER
11/7/2023        RE: Luc Nguyen  [address]  [place] [address]        [facility] GERIATRICS  Chief Complaint   Patient presents with     senior care Regulatory     [place] Medical Record Number:  [id number]  [facility] () [[id number]]    HPI:    Luc Nguyen  is 89 year old (4/16/1932), who is being seen today for a federally mandated E/M visit. Today's concerns are:  Patient feeling good and without concerns.   -Seen today for a regulatory appointment.   -Denies SOB, lightheadedness and CP    BP Readings from Last 3 Encounters:   11/07/23 135/60   09/11/23 136/64   08/23/23 116/63     Pulse Readings from Last 4 Encounters:   11/07/23 68   09/11/23 69   08/23/23 67   08/11/23 72     Wt Readings from Last 4 Encounters:   11/07/23 73.3 kg (161 lb 8 oz)   09/11/23 70 kg (154 lb 6.4 oz)   08/23/23 72.5 kg (159 lb 12.8 oz)   08/11/23 69.6 kg (153 lb 8 oz)     ALLERGIES:Patient has no known allergies.  PAST MEDICAL HISTORY: History reviewed. No pertinent past medical history.  PAST SURGICAL HISTORY:   has no past surgical history on file.  FAMILY HISTORY: family history is not on file.  SOCIAL HISTORY:      MEDICATIONS:  MED REC REQUIRED  Post Medication Reconciliation Status: patient was not discharged from an inpatient facility or TCU         Review of your medicines            Accurate as of November 7, 2023  9:36 AM. If you have any questions, ask your nurse or doctor.                CONTINUE these medicines which have NOT CHANGED        Dose / Directions   acetaminophen 500 MG tablet  Commonly known as: TYLENOL      Dose: 1,000 mg  Take 1,000 mg by mouth 2 times daily +650mg PO BID PRN  Refills: 0     albuterol 0.63 MG/3ML neb solution  Commonly known as: ACCUNEB      Dose: 1 ampule  Take 1 ampule by nebulization 2 times daily May also take BID PRN  Refills: 0     aspirin 81 MG chewable tablet  Commonly known as: ASA      Dose: 81  mg  Take 81 mg by mouth daily  Refills: 0     bisacodyl 10 MG suppository  Commonly known as: DULCOLAX      Dose: 10 mg  Place 1 suppository (10 mg) rectally daily as needed for constipation  Refills: 0     carbamide peroxide 6.5 % otic solution  Commonly known as: DEBROX      Dose: 5 drop  Place 5 drops into both ears 2 times daily as needed for other  Refills: 0     diclofenac 1 % topical gel  Commonly known as: VOLTAREN      Apply topically 4 times daily as needed for moderate pain  Refills: 0     gabapentin 100 MG capsule  Commonly known as: NEURONTIN      200 mg in am and 400 mg at HS  Refills: 0     guaiFENesin 200 MG tablet  Commonly known as: ORGANIDIN      Dose: 400 mg  Take 400 mg by mouth every 4 hours as needed for cough  Refills: 0     hyoscyamine 0.125 MG sublingual tablet  Commonly known as: LEVSIN/SL      Dose: 0.125 mg  Place 1 tablet (0.125 mg) under the tongue every 4 hours as needed for cramping  Refills: 0     LORazepam 0.5 MG tablet  Commonly known as: ATIVAN      Dose: 0.25 mg  Take 0.5 tablets (0.25 mg) by mouth every 2 hours as needed for anxiety  Refills: 0     magnesium sulfate Gran granules  Commonly known as: EPSOM SALT      Apply topically At Bedtime Apply to warm water for foot soak topically at bedtime for pain in bilateral feet  Refills: 0     morphine 10 MG/ML injection      Dose: 5 mg  Inject 5 mg into the vein every 2 hours as needed for severe pain  Refills: 0     naloxone 0.4 MG/ML injection  Commonly known as: NARCAN      Dose: 0.1-0.4 mg  Inject 0.1-0.4 mg into the vein ) Inject 1 ml intramuscularly as needed  for opioid overdose Give 1 ml as needed for opioid  overdose. May give 1 ml in 2-3 minutes if patient is  still unresponsive  Refills: 0     polyethylene glycol 17 g packet  Commonly known as: MIRALAX      Dose: 17 g  Take 17 g by mouth daily as needed for constipation  Refills: 0               Case Management:  I have reviewed the care plan and MDS and do agree with the  "plan. Patient's desire to return to the community is not present. Information reviewed:  Medications, vital signs, orders, and nursing notes.    ROS:  4 point ROS including Respiratory, CV, GI and , other than that noted in the HPI,  is negative    Vitals:  /60   Pulse 68   Temp 97.6  F (36.4  C)   Resp 18   Ht 1.753 m (5' 9\")   Wt 73.3 kg (161 lb 8 oz)   SpO2 94%   BMI 23.85 kg/m    Body mass index is 23.85 kg/m .  Exam:  A & O x 3, NAD. Lungs CTA, non labored. RRR, S1/S2 w/o murmur,gallop or rub.  No edema.  Abdomen soft, nontender, +BT'S. No focal neurological deficits.        Lab/Diagnostic data:   Recent labs in [facility] reviewed by me today.     ASSESSMENT/PLAN  Nonrheumatic aortic valve stenosis  Enrolled with [facility] with goals of care directed at pain control and comfort.     Constipation, unspecified constipation type  Chronic, stable.   Continue with plan of care no changes at this time, adjustment as needed    Moderate dementia without behavioral disturbance, psychotic disturbance, mood disturbance, or anxiety, unspecified dementia type (H)  Chronic, progressive. BIMS assessment 7/15 indicating moderate cognitive impairment.   -Continue LTC support with medication administration, meals and safety. Expect further decline with progression of diease.         Electronically signed by:  Bre Batista NP             Sincerely,        Bre Batista NP      "

## 2024-01-01 ENCOUNTER — DOCUMENTATION ONLY (OUTPATIENT)
Dept: OTHER | Facility: CLINIC | Age: 89
End: 2024-01-01
Payer: OTHER MISCELLANEOUS

## 2024-01-01 ENCOUNTER — NURSING HOME VISIT (OUTPATIENT)
Dept: GERIATRICS | Facility: CLINIC | Age: 89
End: 2024-01-01
Payer: MEDICARE

## 2024-01-01 ENCOUNTER — NURSING HOME VISIT (OUTPATIENT)
Dept: GERIATRICS | Facility: CLINIC | Age: 89
End: 2024-01-01
Payer: OTHER MISCELLANEOUS

## 2024-01-01 ENCOUNTER — DOCUMENTATION ONLY (OUTPATIENT)
Dept: GERIATRICS | Facility: CLINIC | Age: 89
End: 2024-01-01
Payer: OTHER MISCELLANEOUS

## 2024-01-01 VITALS
HEIGHT: 69 IN | OXYGEN SATURATION: 93 % | RESPIRATION RATE: 20 BRPM | BODY MASS INDEX: 25.31 KG/M2 | HEART RATE: 60 BPM | WEIGHT: 170.9 LBS | TEMPERATURE: 97.3 F | DIASTOLIC BLOOD PRESSURE: 75 MMHG | SYSTOLIC BLOOD PRESSURE: 153 MMHG

## 2024-01-01 VITALS
DIASTOLIC BLOOD PRESSURE: 73 MMHG | HEIGHT: 69 IN | HEART RATE: 59 BPM | OXYGEN SATURATION: 94 % | RESPIRATION RATE: 16 BRPM | TEMPERATURE: 97.7 F | BODY MASS INDEX: 25.79 KG/M2 | SYSTOLIC BLOOD PRESSURE: 146 MMHG | WEIGHT: 174.1 LBS

## 2024-01-01 VITALS
SYSTOLIC BLOOD PRESSURE: 153 MMHG | TEMPERATURE: 97.7 F | RESPIRATION RATE: 20 BRPM | BODY MASS INDEX: 25.33 KG/M2 | OXYGEN SATURATION: 92 % | HEART RATE: 62 BPM | WEIGHT: 171 LBS | DIASTOLIC BLOOD PRESSURE: 71 MMHG | HEIGHT: 69 IN

## 2024-01-01 VITALS
HEIGHT: 69 IN | HEART RATE: 61 BPM | BODY MASS INDEX: 25.82 KG/M2 | RESPIRATION RATE: 20 BRPM | TEMPERATURE: 97.6 F | WEIGHT: 174.3 LBS | DIASTOLIC BLOOD PRESSURE: 64 MMHG | OXYGEN SATURATION: 93 % | SYSTOLIC BLOOD PRESSURE: 106 MMHG

## 2024-01-01 DIAGNOSIS — G62.9 NEUROPATHY: ICD-10-CM

## 2024-01-01 DIAGNOSIS — Z51.5 HOSPICE CARE PATIENT: ICD-10-CM

## 2024-01-01 DIAGNOSIS — L98.8 MACERATION OF SKIN: ICD-10-CM

## 2024-01-01 DIAGNOSIS — I25.118 CORONARY ARTERY DISEASE OF NATIVE ARTERY OF NATIVE HEART WITH STABLE ANGINA PECTORIS (H): ICD-10-CM

## 2024-01-01 DIAGNOSIS — I35.0 SEVERE AORTIC STENOSIS: ICD-10-CM

## 2024-01-01 DIAGNOSIS — K59.01 SLOW TRANSIT CONSTIPATION: ICD-10-CM

## 2024-01-01 DIAGNOSIS — M25.511 ACUTE PAIN OF RIGHT SHOULDER: Primary | ICD-10-CM

## 2024-01-01 DIAGNOSIS — Z51.5 HOSPICE CARE PATIENT: Primary | ICD-10-CM

## 2024-01-01 DIAGNOSIS — J44.9 MODERATE CHRONIC OBSTRUCTIVE PULMONARY DISEASE (H): ICD-10-CM

## 2024-01-01 DIAGNOSIS — I35.0 NONRHEUMATIC AORTIC VALVE STENOSIS: ICD-10-CM

## 2024-01-01 DIAGNOSIS — Q25.72 PULMONARY ARTERIOVENOUS MALFORMATION: ICD-10-CM

## 2024-01-01 DIAGNOSIS — F03.B0 MODERATE DEMENTIA WITHOUT BEHAVIORAL DISTURBANCE, PSYCHOTIC DISTURBANCE, MOOD DISTURBANCE, OR ANXIETY, UNSPECIFIED DEMENTIA TYPE (H): ICD-10-CM

## 2024-01-01 DIAGNOSIS — F03.B0 MODERATE DEMENTIA WITHOUT BEHAVIORAL DISTURBANCE, PSYCHOTIC DISTURBANCE, MOOD DISTURBANCE, OR ANXIETY, UNSPECIFIED DEMENTIA TYPE (H): Primary | ICD-10-CM

## 2024-01-01 PROCEDURE — 99309 SBSQ NF CARE MODERATE MDM 30: CPT | Mod: GV | Performed by: NURSE PRACTITIONER

## 2024-01-01 PROCEDURE — 99309 SBSQ NF CARE MODERATE MDM 30: CPT | Performed by: FAMILY MEDICINE

## 2024-01-01 PROCEDURE — 99309 SBSQ NF CARE MODERATE MDM 30: CPT | Mod: GV | Performed by: FAMILY MEDICINE

## 2024-01-01 PROCEDURE — 99308 SBSQ NF CARE LOW MDM 20: CPT | Mod: GV | Performed by: NURSE PRACTITIONER

## 2024-01-01 RX ORDER — TROLAMINE SALICYLATE 10 G/100G
CREAM TOPICAL 2 TIMES DAILY
Start: 2024-01-01

## 2024-01-05 NOTE — PROGRESS NOTES
[facility] GERIATRICS  Chief Complaint   Patient presents with    [facility] Medical Record Number:  [id number]  Place of Service where encounter took place:  [facility] () [[id number]]    HPI:    Luc Nguyen  is 89 year old (4/16/1932), who is being seen today for a federally mandated E/M visit.     Today's concerns are:    - Resident seen and examined, chart reviewed and discussed with the nursing staff.   -  COPD: reports productive cough daily, brings up whitish phlegm. Denies wheezing, or SOB,  - Heart: denies chest pain.   - [facility]: sleeps well, appetite is fine.   - DNP and RN have no concern today.   --------------------------------    MEDICATIONS:  MED REC REQUIRED  Post Medication Reconciliation Status: medication reconcilation previously completed during another office visit       Review of your medicines            Accurate as of January 8, 2024  6:48 AM. If you have any questions, ask your nurse or doctor.                CONTINUE these medicines which have NOT CHANGED        Dose / Directions   acetaminophen 500 MG tablet  Commonly known as: TYLENOL      Dose: 1,000 mg  Take 1,000 mg by mouth 2 times daily +650mg PO BID PRN  Refills: 0     albuterol 0.63 MG/3ML neb solution  Commonly known as: ACCUNEB      Dose: 1 ampule  Take 1 ampule by nebulization 2 times daily May also take BID PRN  Refills: 0     aspirin 81 MG chewable tablet  Commonly known as: ASA      Dose: 81 mg  Take 81 mg by mouth daily  Refills: 0     bisacodyl 10 MG suppository  Commonly known as: DULCOLAX      Dose: 10 mg  Place 1 suppository (10 mg) rectally daily as needed for constipation  Refills: 0     carbamide peroxide 6.5 % otic solution  Commonly known as: DEBROX      Dose: 5 drop  Place 5 drops into both ears 2 times daily as needed for other  Refills: 0     diclofenac 1 % topical gel  Commonly known as: VOLTAREN      Apply topically 4 times daily as needed for moderate  "pain  Refills: 0     gabapentin 100 MG capsule  Commonly known as: NEURONTIN      200 mg in am and 400 mg at HS  Refills: 0     guaiFENesin 200 MG tablet  Commonly known as: ORGANIDIN      Dose: 400 mg  Take 400 mg by mouth every 4 hours as needed for cough  Refills: 0     hyoscyamine 0.125 MG sublingual tablet  Commonly known as: LEVSIN/SL      Dose: 0.125 mg  Place 1 tablet (0.125 mg) under the tongue every 4 hours as needed for cramping  Refills: 0     LORazepam 0.5 MG tablet  Commonly known as: ATIVAN      Dose: 0.25 mg  Take 0.5 tablets (0.25 mg) by mouth every 2 hours as needed for anxiety  Refills: 0     magnesium sulfate Gran granules  Commonly known as: EPSOM SALT      Apply topically At Bedtime Apply to warm water for foot soak topically at bedtime for pain in bilateral feet  Refills: 0     morphine 10 MG/ML injection      Dose: 5 mg  Inject 5 mg into the vein every 2 hours as needed for severe pain  Refills: 0     naloxone 0.4 MG/ML injection  Commonly known as: NARCAN      Dose: 0.1-0.4 mg  Inject 0.1-0.4 mg into the vein ) Inject 1 ml intramuscularly as needed  for opioid overdose Give 1 ml as needed for opioid  overdose. May give 1 ml in 2-3 minutes if patient is  still unresponsive  Refills: 0     polyethylene glycol 17 g packet  Commonly known as: MIRALAX      Dose: 17 g  Take 17 g by mouth daily as needed for constipation  Refills: 0           Case Management:  I have reviewed the care plan and MDS and do agree with the plan. Patient's desire to return to the community is currently living in a community environment. Information reviewed:  Medications, vital signs, orders, and nursing notes.    ROS:  4 point ROS including Respiratory, CV, GI and , other than that noted in the HPI,  is negative    Vitals:  BP (!) 146/73   Pulse 59   Temp 97.7  F (36.5  C)   Resp 16   Ht 1.753 m (5' 9\")   Wt 79 kg (174 lb 1.6 oz)   SpO2 94%   BMI 25.71 kg/m    Body mass index is 25.71 kg/m .  Wt Readings from " Last 5 Encounters:   01/08/24 79 kg (174 lb 1.6 oz)   11/07/23 73.3 kg (161 lb 8 oz)   09/11/23 70 kg (154 lb 6.4 oz)   08/23/23 72.5 kg (159 lb 12.8 oz)   08/11/23 69.6 kg (153 lb 8 oz)     Exam:   GENERAL APPEARANCE:  in no distress,   RESP:  Unlabored breathing. CTA b/l. But diminished over the   cases.   CV:  S1S2 audible, regular HR, 3/6 systolic murmur appreciated.  Over right side. Traces pitting pedal edema  ABDOMEN:  soft, NT/ND, BS audible.   M/S:   no joint deformity noted on observation.   SKIN:  No rash noted on observation  NEURO:   No NFD appreciated on observation.   PSYCH:  affect and mood normal    Lab/Diagnostic data: Reviewed in the chart and EHR.        ASSESSMENT/PLAN  ----------------------------   (J44.9) Moderate chronic obstructive pulmonary disease (H)   - appears stable today.        (G62.9) Neuropathy  - feet, worse at night, currently on gabapentin 200 mg bid. Will increase night dose to 400 mg, keep day time as is.   - assess in one week.     Slow transit constipation:  - on regimen, no  concern.       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  Gained 21 lbs. Since the  admission to hospice.   - appears stable today. Not on O2. Able to complete full sentence, no respiratory distress.   - Hospice may consider review the chart for possible graduation.   - Discussed with the grad-daughter at the bedside, and with the PCP  - analgesia is optimal with the current regimen.   - Appreciate collaboration with Hospice Team for symptom management in collaboration with cares for maximum comfort at end-of-life        Electronically signed by:  Tesfaye Boggs MD

## 2024-01-07 PROBLEM — J44.9 MODERATE CHRONIC OBSTRUCTIVE PULMONARY DISEASE (H): Status: RESOLVED | Noted: 2023-02-16 | Resolved: 2024-01-01

## 2024-01-07 PROBLEM — F03.B0 MODERATE DEMENTIA WITHOUT BEHAVIORAL DISTURBANCE, PSYCHOTIC DISTURBANCE, MOOD DISTURBANCE, OR ANXIETY, UNSPECIFIED DEMENTIA TYPE (H): Status: ACTIVE | Noted: 2024-01-01

## 2024-01-07 PROBLEM — I25.118 CORONARY ARTERY DISEASE OF NATIVE ARTERY OF NATIVE HEART WITH STABLE ANGINA PECTORIS (H): Status: ACTIVE | Noted: 2023-01-01

## 2024-01-08 NOTE — LETTER
1/8/2024        RE: Luc Nguyen  [address]  [place] [address]        M [facility] GERIATRICS  Chief Complaint   Patient presents with     [facility] Medical Record Number:  [id number]  [facility] () [[id number]]    HPI:    Luc Nguyen  is 89 year old (4/16/1932), who is being seen today for a federally mandated E/M visit.     Today's concerns are:    - Resident seen and examined, chart reviewed and discussed with the nursing staff.   -  COPD: reports productive cough daily, brings up whitish phlegm. Denies wheezing, or SOB,  - Heart: denies chest pain.   - [facility]: sleeps well, appetite is fine.   - DNP and RN have no concern today.   --------------------------------    MEDICATIONS:  MED REC REQUIRED  Post Medication Reconciliation Status: medication reconcilation previously completed during another office visit       Review of your medicines            Accurate as of January 8, 2024  6:48 AM. If you have any questions, ask your nurse or doctor.                CONTINUE these medicines which have NOT CHANGED        Dose / Directions   acetaminophen 500 MG tablet  Commonly known as: TYLENOL      Dose: 1,000 mg  Take 1,000 mg by mouth 2 times daily +650mg PO BID PRN  Refills: 0     albuterol 0.63 MG/3ML neb solution  Commonly known as: ACCUNEB      Dose: 1 ampule  Take 1 ampule by nebulization 2 times daily May also take BID PRN  Refills: 0     aspirin 81 MG chewable tablet  Commonly known as: ASA      Dose: 81 mg  Take 81 mg by mouth daily  Refills: 0     bisacodyl 10 MG suppository  Commonly known as: DULCOLAX      Dose: 10 mg  Place 1 suppository (10 mg) rectally daily as needed for constipation  Refills: 0     carbamide peroxide 6.5 % otic solution  Commonly known as: DEBROX      Dose: 5 drop  Place 5 drops into both ears 2 times daily as needed for other  Refills: 0     diclofenac 1 % topical  "gel  Commonly known as: VOLTAREN      Apply topically 4 times daily as needed for moderate pain  Refills: 0     gabapentin 100 MG capsule  Commonly known as: NEURONTIN      200 mg in am and 400 mg at HS  Refills: 0     guaiFENesin 200 MG tablet  Commonly known as: ORGANIDIN      Dose: 400 mg  Take 400 mg by mouth every 4 hours as needed for cough  Refills: 0     hyoscyamine 0.125 MG sublingual tablet  Commonly known as: LEVSIN/SL      Dose: 0.125 mg  Place 1 tablet (0.125 mg) under the tongue every 4 hours as needed for cramping  Refills: 0     LORazepam 0.5 MG tablet  Commonly known as: ATIVAN      Dose: 0.25 mg  Take 0.5 tablets (0.25 mg) by mouth every 2 hours as needed for anxiety  Refills: 0     magnesium sulfate Gran granules  Commonly known as: EPSOM SALT      Apply topically At Bedtime Apply to warm water for foot soak topically at bedtime for pain in bilateral feet  Refills: 0     morphine 10 MG/ML injection      Dose: 5 mg  Inject 5 mg into the vein every 2 hours as needed for severe pain  Refills: 0     naloxone 0.4 MG/ML injection  Commonly known as: NARCAN      Dose: 0.1-0.4 mg  Inject 0.1-0.4 mg into the vein ) Inject 1 ml intramuscularly as needed  for opioid overdose Give 1 ml as needed for opioid  overdose. May give 1 ml in 2-3 minutes if patient is  still unresponsive  Refills: 0     polyethylene glycol 17 g packet  Commonly known as: MIRALAX      Dose: 17 g  Take 17 g by mouth daily as needed for constipation  Refills: 0           Case Management:  I have reviewed the care plan and MDS and do agree with the plan. Patient's desire to return to the community is currently living in a community environment. Information reviewed:  Medications, vital signs, orders, and nursing notes.    ROS:  4 point ROS including Respiratory, CV, GI and , other than that noted in the HPI,  is negative    Vitals:  BP (!) 146/73   Pulse 59   Temp 97.7  F (36.5  C)   Resp 16   Ht 1.753 m (5' 9\")   Wt 79 kg (174 lb " 1.6 oz)   SpO2 94%   BMI 25.71 kg/m    Body mass index is 25.71 kg/m .  Wt Readings from Last 5 Encounters:   01/08/24 79 kg (174 lb 1.6 oz)   11/07/23 73.3 kg (161 lb 8 oz)   09/11/23 70 kg (154 lb 6.4 oz)   08/23/23 72.5 kg (159 lb 12.8 oz)   08/11/23 69.6 kg (153 lb 8 oz)     Exam:   GENERAL APPEARANCE:  in no distress,   RESP:  Unlabored breathing. CTA b/l. But diminished over the   cases.   CV:  S1S2 audible, regular HR, 3/6 systolic murmur appreciated.  Over right side. Traces pitting pedal edema  ABDOMEN:  soft, NT/ND, BS audible.   M/S:   no joint deformity noted on observation.   SKIN:  No rash noted on observation  NEURO:   No NFD appreciated on observation.   PSYCH:  affect and mood normal    Lab/Diagnostic data: Reviewed in the chart and EHR.        ASSESSMENT/PLAN  ----------------------------   (J44.9) Moderate chronic obstructive pulmonary disease (H)   - appears stable today.        (G62.9) Neuropathy  - feet, worse at night, currently on gabapentin 200 mg bid. Will increase night dose to 400 mg, keep day time as is.   - assess in one week.     Slow transit constipation:  - on regimen, no  concern.       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  Gained 21 lbs. Since the  admission to hospice.   - appears stable today. Not on O2. Able to complete full sentence, no respiratory distress.   - Hospice may consider review the chart for possible graduation.   - Discussed with the grad-daughter at the bedside, and with the PCP  - analgesia is optimal with the current regimen.   - Appreciate collaboration with Hospice Team for symptom management in collaboration with cares for maximum comfort at end-of-life        Electronically signed by:  Tesfaye Boggs  MD          Sincerely,        Tesfaye Boggs MD

## 2024-01-16 NOTE — LETTER
"    1/16/2024        RE: Luc Nguyen  [address]  [place] [address]        M [facility] GERIATRICS    Chief Complaint   Patient presents with     RECHECK     HPI:  Luc Nguyen is a 89 year old  (4/16/1932), who is being seen today for an episodic care visit at: [facility] () [[id number]]. Today's concern is: Patient with increased right shoulder pain and limited ROM. No injury noted and without recent falls. Taking morphine solutab and tylenol scheduled.     BP Readings from Last 3 Encounters:   01/16/24 (!) 153/75   01/08/24 (!) 146/73   11/07/23 135/60     Wt Readings from Last 4 Encounters:   01/16/24 77.5 kg (170 lb 14.4 oz)   01/08/24 79 kg (174 lb 1.6 oz)   11/07/23 73.3 kg (161 lb 8 oz)   09/11/23 70 kg (154 lb 6.4 oz)       Allergies, and PMH/PSH reviewed in EPIC today.  REVIEW OF SYSTEMS:  4 point ROS including Respiratory, CV, GI and , other than that noted in the HPI,  is negative    Objective:   BP (!) 153/75   Pulse 60   Temp 97.3  F (36.3  C)   Resp 20   Ht 1.753 m (5' 9\")   Wt 77.5 kg (170 lb 14.4 oz)   SpO2 93%   BMI 25.24 kg/m    A & O x 3, NAD. Lungs CTA, non labored. RRR, S1/S2 w/o murmur,gallop or rub.  edema.  Abdomen soft, nontender, +BT'S. No focal neurological deficits.        No recent labs 2/2 hospice status and goals of care.     Assessment/Plan:  1. Acute pain of right shoulder    2. Hospice care patient    -Acute right shoulder pain without known cause.   -aspercreme apply to right shoulder BID.   -No x-ray given goals of care and hospice status.     MED REC REQUIRED  Post Medication Reconciliation Status: patient was not discharged from an inpatient facility or TCU      Orders:  See above for new orders.       Electronically signed by: Bre Batista NP      Sincerely,        Bre Batista NP      "

## 2024-01-16 NOTE — PROGRESS NOTES
"[facility] GERIATRICS    Chief Complaint   Patient presents with    RECHECK     HPI:  Luc Nguyen is a 89 year old  (4/16/1932), who is being seen today for an episodic care visit at: [facility] () [[id number]]. Today's concern is: Patient with increased right shoulder pain and limited ROM. No injury noted and without recent falls. Taking morphine solutab and tylenol scheduled.     BP Readings from Last 3 Encounters:   01/16/24 (!) 153/75   01/08/24 (!) 146/73   11/07/23 135/60     Wt Readings from Last 4 Encounters:   01/16/24 77.5 kg (170 lb 14.4 oz)   01/08/24 79 kg (174 lb 1.6 oz)   11/07/23 73.3 kg (161 lb 8 oz)   09/11/23 70 kg (154 lb 6.4 oz)       Allergies, and PMH/PSH reviewed in EPIC today.  REVIEW OF SYSTEMS:  4 point ROS including Respiratory, CV, GI and , other than that noted in the HPI,  is negative    Objective:   BP (!) 153/75   Pulse 60   Temp 97.3  F (36.3  C)   Resp 20   Ht 1.753 m (5' 9\")   Wt 77.5 kg (170 lb 14.4 oz)   SpO2 93%   BMI 25.24 kg/m    A & O x 3, NAD. Lungs CTA, non labored. RRR, S1/S2 w/o murmur,gallop or rub.  edema.  Abdomen soft, nontender, +BT'S. No focal neurological deficits.        No recent labs 2/2 hospice status and goals of care.     Assessment/Plan:  1. Acute pain of right shoulder    2. Hospice care patient    -Acute right shoulder pain without known cause.   -aspercreme apply to right shoulder BID.   -No x-ray given goals of care and hospice status.     MED REC REQUIRED  Post Medication Reconciliation Status: patient was not discharged from an inpatient facility or TCU      Orders:  See above for new orders.       Electronically signed by: Bre Batista NP    "

## 2024-03-26 NOTE — PROGRESS NOTES
[facility] GERIATRICS  Chief Complaint   Patient presents with    Annual Comprehensive Nursing Home     [facility] Medical Record Number:  [id number]  Place of Service where encounter took place:  [facility] () [[id number]]    HPI:    Luc Nguyen  is a 89 year old  (4/16/1932), who is being seen today for an annual comprehensive visit. HPI information obtained from:   Patient resting comfortably in room. Currently enrolled with [facility] with goals directed at pain control and comfort. Denies CP, SOB and lightheadedness. Staff and patient without further concerns.     BP Readings from Last 3 Encounters:   03/26/24 (!) 153/71   01/16/24 (!) 153/75   01/08/24 (!) 146/73     Pulse Readings from Last 4 Encounters:   03/26/24 62   01/16/24 60   01/08/24 59   11/07/23 68     Wt Readings from Last 4 Encounters:   03/26/24 77.6 kg (171 lb)   01/16/24 77.5 kg (170 lb 14.4 oz)   01/08/24 79 kg (174 lb 1.6 oz)   11/07/23 73.3 kg (161 lb 8 oz)         ALLERGIES: Patient has no known allergies.  PAST MEDICAL HISTORY: History reviewed. No pertinent past medical history.   PAST SURGICAL HISTORY:  has no past surgical history on file.      Current Outpatient Medications:     acetaminophen (TYLENOL) 500 MG tablet, Take 1,000 mg by mouth 2 times daily +650mg PO BID PRN, Disp: , Rfl:     albuterol (ACCUNEB) 0.63 MG/3ML neb solution, Take 1 ampule by nebulization 2 times daily May also take BID PRN, Disp: , Rfl:     aspirin (ASA) 81 MG chewable tablet, Take 81 mg by mouth daily, Disp: , Rfl:     bisacodyl (DULCOLAX) 10 MG suppository, Place 1 suppository (10 mg) rectally daily as needed for constipation, Disp: , Rfl:     carbamide peroxide (DEBROX) 6.5 % otic solution, Place 5 drops into both ears 2 times daily as needed for other, Disp: , Rfl:     diclofenac (VOLTAREN) 1 % topical gel, Apply topically 4 times daily as needed for moderate pain, Disp: , Rfl:     gabapentin (NEURONTIN) 100 MG capsule, 200 mg in am  and 400 mg at HS, Disp: , Rfl:     guaiFENesin (ORGANIDIN) 200 MG tablet, Take 400 mg by mouth every 4 hours as needed for cough, Disp: , Rfl:     hyoscyamine (LEVSIN/SL) 0.125 MG sublingual tablet, Place 1 tablet (0.125 mg) under the tongue every 4 hours as needed for cramping, Disp: , Rfl:     LORazepam (ATIVAN) 0.5 MG tablet, Take 0.5 tablets (0.25 mg) by mouth every 2 hours as needed for anxiety, Disp: , Rfl:     magnesium sulfate (EPSOM SALT) GRAN granules, Apply topically At Bedtime Apply to warm water for foot soak topically at bedtime for pain in bilateral feet, Disp: , Rfl:     morphine 10 MG/ML injection, Inject 5 mg into the vein every 2 hours as needed for severe pain, Disp: , Rfl: 0    naloxone (NARCAN) 0.4 MG/ML injection, Inject 0.1-0.4 mg into the vein ) Inject 1 ml intramuscularly as needed for opioid overdose Give 1 ml as needed for opioid overdose. May give 1 ml in 2-3 minutes if patient is still unresponsive, Disp: , Rfl:     polyethylene glycol (MIRALAX) 17 g packet, Take 17 g by mouth daily as needed for constipation, Disp: , Rfl:     trolamine salicylate (ASPERCREME) 10 % external cream, Apply topically 2 times daily, Disp: , Rfl:      MED REC REQUIRED  Post Medication Reconciliation Status: patient was not discharged from an inpatient facility or TCU      Case Management:  I have reviewed the facility/SNF care plan/MDS, including the falls risk, nutrition and pain screening. I also reviewed the current immunizations, and preventive care.. Future cancer screening is not clinically indicated secondary to age/goals of care. Patient's desire to return to the community is not present. Current Level of Care is appropriate.mhgeroimmunization: Annual Influenza per facility protocol    Advance Directive Discussion:    I reviewed the current advanced directives as reflected in EPIC, the POLST and the facility chart, and verified the congruency of orders . I did not contacted the first party and  "discussed the plan of care. I did review the advance directives with the resident.     Team Discussion:  I communicated with the appropriate disciplines involved with the Plan of Care: Nursing  ,   , and Dietitian  .   Patient's goal is: pain control and comfort.  Information reviewed: Medications, vital signs, orders, and nursing notes.    ROS:  10 point ROS of systems including Constitutional, Eyes, Respiratory, Cardiovascular, Gastroenterology, Genitourinary, Integumentary, Musculoskeletal, Psychiatric were all negative except for pertinent positives noted in my HPI.    Vitals:  BP (!) 153/71   Pulse 62   Temp 97.7  F (36.5  C)   Resp 20   Ht 1.753 m (5' 9\")   Wt 77.6 kg (171 lb)   SpO2 92%   BMI 25.25 kg/m   Body mass index is 25.25 kg/m .  Exam:  A & O x 3, NAD. Lungs CTA, non labored. RRR, S1/S2 w/o murmur,gallop or rub. no edema.  Abdomen soft, nontender, +BT'S. No focal neurological deficits.        Lab/Diagnostic data:   Labs done in SNF are in [place] EPIC. Please refer to them using [employer]/Care Everywhere.No future labs given hospice status and goals of care.     ASSESSMENT/PLAN     Hospice care patient  Moderate dementia without behavioral disturbance, psychotic disturbance, mood disturbance, or anxiety, unspecified dementia type (H)  Moderate chronic obstructive pulmonary disease (H)  Severe aortic stenosis  Nonrheumatic aortic valve stenosis  Neuropathy  Acute and chronic conditions reviewed and managed by provider.   Provider reviewed records from facility, and interpreted most recent imaging/lab work, and vital signs.    Appreciate collaboration with  hospice team for symptom management in collaboration with cares for maximum comfort at end-of-life.   Improved neuropathy. Gabapentin recently increased 200 AM and 400 mg PM. Continue to monitor and make adjustments as clinically indicated.   Patient able to make needs known. Continues to gain weight while on hospice care.  Continue " with plan of care no changes at this time, adjustment as needed      Orders:  See above, otherwise the current medical regimen is effective;  continue present plan and medications.      Electronically signed by:  Bre Batista NP

## 2024-03-26 NOTE — LETTER
3/26/2024        RE: Luc Nguyen  [address]  [place] [address]        M [facility] GERIATRICS  Chief Complaint   Patient presents with     Annual Comprehensive Nursing Home     [place] Medical Record Number:  [id number]  [facility] () [[id number]]    HPI:    Luc Nguyen  is a 89 year old  (4/16/1932), who is being seen today for an annual comprehensive visit. HPI information obtained from:   Patient resting comfortably in room. Currently enrolled with [facility] with goals directed at pain control and comfort. Denies CP, SOB and lightheadedness. Staff and patient without further concerns.     BP Readings from Last 3 Encounters:   03/26/24 (!) 153/71   01/16/24 (!) 153/75   01/08/24 (!) 146/73     Pulse Readings from Last 4 Encounters:   03/26/24 62   01/16/24 60   01/08/24 59   11/07/23 68     Wt Readings from Last 4 Encounters:   03/26/24 77.6 kg (171 lb)   01/16/24 77.5 kg (170 lb 14.4 oz)   01/08/24 79 kg (174 lb 1.6 oz)   11/07/23 73.3 kg (161 lb 8 oz)         ALLERGIES: Patient has no known allergies.  PAST MEDICAL HISTORY: History reviewed. No pertinent past medical history.   PAST SURGICAL HISTORY:  has no past surgical history on file.      Current Outpatient Medications:      acetaminophen (TYLENOL) 500 MG tablet, Take 1,000 mg by mouth 2 times daily +650mg PO BID PRN, Disp: , Rfl:      albuterol (ACCUNEB) 0.63 MG/3ML neb solution, Take 1 ampule by nebulization 2 times daily May also take BID PRN, Disp: , Rfl:      aspirin (ASA) 81 MG chewable tablet, Take 81 mg by mouth daily, Disp: , Rfl:      bisacodyl (DULCOLAX) 10 MG suppository, Place 1 suppository (10 mg) rectally daily as needed for constipation, Disp: , Rfl:      carbamide peroxide (DEBROX) 6.5 % otic solution, Place 5 drops into both ears 2 times daily as needed for other, Disp: , Rfl:      diclofenac (VOLTAREN) 1 % topical gel, Apply topically 4 times daily  as needed for moderate pain, Disp: , Rfl:      gabapentin (NEURONTIN) 100 MG capsule, 200 mg in am and 400 mg at HS, Disp: , Rfl:      guaiFENesin (ORGANIDIN) 200 MG tablet, Take 400 mg by mouth every 4 hours as needed for cough, Disp: , Rfl:      hyoscyamine (LEVSIN/SL) 0.125 MG sublingual tablet, Place 1 tablet (0.125 mg) under the tongue every 4 hours as needed for cramping, Disp: , Rfl:      LORazepam (ATIVAN) 0.5 MG tablet, Take 0.5 tablets (0.25 mg) by mouth every 2 hours as needed for anxiety, Disp: , Rfl:      magnesium sulfate (EPSOM SALT) GRAN granules, Apply topically At Bedtime Apply to warm water for foot soak topically at bedtime for pain in bilateral feet, Disp: , Rfl:      morphine 10 MG/ML injection, Inject 5 mg into the vein every 2 hours as needed for severe pain, Disp: , Rfl: 0     naloxone (NARCAN) 0.4 MG/ML injection, Inject 0.1-0.4 mg into the vein ) Inject 1 ml intramuscularly as needed for opioid overdose Give 1 ml as needed for opioid overdose. May give 1 ml in 2-3 minutes if patient is still unresponsive, Disp: , Rfl:      polyethylene glycol (MIRALAX) 17 g packet, Take 17 g by mouth daily as needed for constipation, Disp: , Rfl:      trolamine salicylate (ASPERCREME) 10 % external cream, Apply topically 2 times daily, Disp: , Rfl:      MED REC REQUIRED  Post Medication Reconciliation Status: patient was not discharged from an inpatient facility or TCU      Case Management:  I have reviewed the facility/SNF care plan/MDS, including the falls risk, nutrition and pain screening. I also reviewed the current immunizations, and preventive care.. Future cancer screening is not clinically indicated secondary to age/goals of care. Patient's desire to return to the community is not present. Current Level of Care is appropriate.mhgeroimmunization: Annual Influenza per facility protocol    Advance Directive Discussion:    I reviewed the current advanced directives as reflected in EPIC, the POLST and  "the facility chart, and verified the congruency of orders . I did not contacted the first party and discussed the plan of care. I did review the advance directives with the resident.     Team Discussion:  I communicated with the appropriate disciplines involved with the Plan of Care: Nursing  ,   , and Dietitian  .   Patient's goal is: pain control and comfort.  Information reviewed: Medications, vital signs, orders, and nursing notes.    ROS:  10 point ROS of systems including Constitutional, Eyes, Respiratory, Cardiovascular, Gastroenterology, Genitourinary, Integumentary, Musculoskeletal, Psychiatric were all negative except for pertinent positives noted in my HPI.    Vitals:  BP (!) 153/71   Pulse 62   Temp 97.7  F (36.5  C)   Resp 20   Ht 1.753 m (5' 9\")   Wt 77.6 kg (171 lb)   SpO2 92%   BMI 25.25 kg/m   Body mass index is 25.25 kg/m .  Exam:  A & O x 3, NAD. Lungs CTA, non labored. RRR, S1/S2 w/o murmur,gallop or rub. no edema.  Abdomen soft, nontender, +BT'S. No focal neurological deficits.        Lab/Diagnostic data:   Labs done in SNF are in [place] EPIC. Please refer to them using [facility]/Care Everywhere.No future labs given hospice status and goals of care.     ASSESSMENT/PLAN     Hospice care patient  Moderate dementia without behavioral disturbance, psychotic disturbance, mood disturbance, or anxiety, unspecified dementia type (H)  Moderate chronic obstructive pulmonary disease (H)  Severe aortic stenosis  Nonrheumatic aortic valve stenosis  Neuropathy  Acute and chronic conditions reviewed and managed by provider.   Provider reviewed records from facility, and interpreted most recent imaging/lab work, and vital signs.    Appreciate collaboration with  hospice team for symptom management in collaboration with cares for maximum comfort at end-of-life.   Improved neuropathy. Gabapentin recently increased 200 AM and 400 mg PM. Continue to monitor and make adjustments as clinically " indicated.   Patient able to make needs known. Continues to gain weight while on hospice care.  Continue with plan of care no changes at this time, adjustment as needed      Orders:  See above, otherwise the current medical regimen is effective;  continue present plan and medications.      Electronically signed by:  Bre Batista NP          Sincerely,        Bre Batista NP

## 2024-05-09 NOTE — PROGRESS NOTES
[facility] GERIATRICS  Chief Complaint   Patient presents with    detention [facility] Medical Record Number:  [id number]  Place of Service where encounter took place:  [facility] () [[id number]]    HPI:    Luc Nguyen  is 89 year old (4/16/1932), who is being seen today for a federally mandated E/M visit.     Today's concerns are:    - Resident seen and examined, chart reviewed and discussed with the nursing staff.     -  COPD:- Cough and drinks him up, grayish in color.  Endorses had chest pain but 10 days ago better now.    - Heart: on lasix x 7d days started on 5/10.     - [facility]: did not sleep well last night due to his heel hurts. Daughter reports that when he takes morphine at night, it helps him with his pain and sleeps very good.  Patient does not keep the Unna boots on.    - buttock MSAD: seen by NP wound on 5/10.     - DNP and RN have no concern today.   --------------------------------    MEDICATIONS:  MED REC REQUIRED  Post Medication Reconciliation Status: medication reconcilation previously completed during another office visit       Review of your medicines            Accurate as of May 12, 2024 11:32 AM. If you have any questions, ask your nurse or doctor.                CONTINUE these medicines which have NOT CHANGED        Dose / Directions   acetaminophen 500 MG tablet  Commonly known as: TYLENOL      Dose: 1,000 mg  Take 1,000 mg by mouth 2 times daily +650mg PO BID PRN  Refills: 0     albuterol 0.63 MG/3ML neb solution  Commonly known as: ACCUNEB      Dose: 1 ampule  Take 1 ampule by nebulization 2 times daily May also take BID PRN  Refills: 0     aspirin 81 MG chewable tablet  Commonly known as: ASA      Dose: 81 mg  Take 81 mg by mouth daily  Refills: 0     bisacodyl 10 MG suppository  Commonly known as: DULCOLAX      Dose: 10 mg  Place 1 suppository (10 mg) rectally daily as needed for constipation  Refills: 0     carbamide peroxide 6.5 % otic  solution  Commonly known as: DEBROX      Dose: 5 drop  Place 5 drops into both ears 2 times daily as needed for other  Refills: 0     diclofenac 1 % topical gel  Commonly known as: VOLTAREN      Apply topically 4 times daily as needed for moderate pain  Refills: 0     gabapentin 100 MG capsule  Commonly known as: NEURONTIN      200 mg in am and 400 mg at HS  Refills: 0     guaiFENesin 200 MG tablet  Commonly known as: ORGANIDIN      Dose: 400 mg  Take 400 mg by mouth every 4 hours as needed for cough  Refills: 0     hyoscyamine 0.125 MG sublingual tablet  Commonly known as: LEVSIN/SL      Dose: 0.125 mg  Place 1 tablet (0.125 mg) under the tongue every 4 hours as needed for cramping  Refills: 0     LORazepam 0.5 MG tablet  Commonly known as: ATIVAN      Dose: 0.25 mg  Take 0.5 tablets (0.25 mg) by mouth every 2 hours as needed for anxiety  Refills: 0     magnesium sulfate Gran granules  Commonly known as: EPSOM SALT      Apply topically At Bedtime Apply to warm water for foot soak topically at bedtime for pain in bilateral feet  Refills: 0     morphine 10 MG/ML injection      Dose: 5 mg  Inject 5 mg into the vein every 2 hours as needed for severe pain  Refills: 0     naloxone 0.4 MG/ML injection  Commonly known as: NARCAN      Dose: 0.1-0.4 mg  Inject 0.1-0.4 mg into the vein ) Inject 1 ml intramuscularly as needed  for opioid overdose Give 1 ml as needed for opioid  overdose. May give 1 ml in 2-3 minutes if patient is  still unresponsive  Refills: 0     polyethylene glycol 17 g packet  Commonly known as: MIRALAX      Dose: 17 g  Take 17 g by mouth daily as needed for constipation  Refills: 0     trolamine salicylate 10 % external cream  Commonly known as: ASPERCREME  Used for: Acute pain of right shoulder      Apply topically 2 times daily  Refills: 0           Case Management:  I have reviewed the care plan and MDS and do agree with the plan. Patient's desire to return to the community is currently living in a  "community environment. Information reviewed:  Medications, vital signs, orders, and nursing notes.    ROS:  4 point ROS including Respiratory, CV, GI and , other than that noted in the HPI,  is negative    Vitals:  /64   Pulse 61   Temp 97.6  F (36.4  C)   Resp 20   Ht 1.753 m (5' 9\")   Wt 79.1 kg (174 lb 4.8 oz)   SpO2 93%   BMI 25.74 kg/m    Body mass index is 25.74 kg/m .  Wt Readings from Last 5 Encounters:   05/12/24 79.1 kg (174 lb 4.8 oz)   03/26/24 77.6 kg (171 lb)   01/16/24 77.5 kg (170 lb 14.4 oz)   01/08/24 79 kg (174 lb 1.6 oz)   11/07/23 73.3 kg (161 lb 8 oz)       Exam:   GENERAL APPEARANCE:  in no distress,   RESP:  Unlabored breathing. CTA b/l. But diminished over the   cases.   CV:  S1S2 audible, regular HR, 3/6 systolic murmur appreciated over right side.  2+ and on the right foot and 3+ on the left foot pedal edema ABDOMEN:  soft, NT/ND, BS audible.   M/S:   no joint deformity noted on observation.   SKIN: keratotic whitish-grayish scab-like over lateral surface of left heel tender to touch.  Surrounding erythema is blanchable.  No drainage.  Tender to touch.    NEURO:   No NFD appreciated on observation.   PSYCH:  affect and mood normal    Lab/Diagnostic data: Reviewed in the chart and EHR.        ASSESSMENT/PLAN  ----------------------------   (J44.9) Moderate chronic obstructive pulmonary disease (H)   - appears stable. Ongoing productive cough, lung CTA b/l. Continue current regimen       Neuropathy  Keratotic tissue over left heel   - tender to touch, blanchable erythema, no drainage.  Pain with putting pressure on it especially at night, Has not been using Unu boot.   - will order foam dressing, and we put on neno boot, and felt comfortable.  Daughter reports that there is improvement to the swelling on the left leg.  -Counseled the daughter that we will try a foam dressing and Unna boot prior to morphine at bedtime for pain but this might cause sedation and constipation as " well.  Hospice team to continue to follow.    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 Change dressing q M/W/F and prn.      Slow transit constipation:  - on regimen, no  concern.       hx of NSTEMI (non-ST elevated myocardial infarction)   Coronary artery disease of native artery of native heart with stable angina pectoris (H)  Pulmonary arteriovenous malformation  Severe aortic stenosis  [facility] [place] [facility]  - some orthopnea and peripheral edema. Started on lasix 20 mg daily x 7 days on 5/10, improving.   - - analgesia is optimal with the current regimen.   - Appreciate collaboration with Hospice Team for symptom management in collaboration with cares for maximum comfort at end-of-life        Electronically signed by:  Tesfaye Boggs MD

## 2024-05-13 NOTE — LETTER
5/13/2024        RE: Luc Nguyen  [address]  [place] [address]        [facility] GERIATRICS  Chief Complaint   Patient presents with     long term [facility] Medical Record Number:  [id number]  Place of Service where encounter took place:  [facility] () [[id number]]    HPI:    Luc Nguyen  is 89 year old (4/16/1932), who is being seen today for a federally mandated E/M visit.     Today's concerns are:    - Resident seen and examined, chart reviewed and discussed with the nursing staff.     -  COPD:- Cough and drinks him up, grayish in color.  Endorses had chest pain but 10 days ago better now.    - Heart: on lasix x 7d days started on 5/10.     - [facility]: did not sleep well last night due to his heel hurts. Daughter reports that when he takes morphine at night, it helps him with his pain and sleeps very good.  Patient does not keep the Unna boots on.    - buttock MSAD: seen by NP wound on 5/10.     - DNP and RN have no concern today.   --------------------------------    MEDICATIONS:  MED REC REQUIRED  Post Medication Reconciliation Status: medication reconcilation previously completed during another office visit       Review of your medicines            Accurate as of May 12, 2024 11:32 AM. If you have any questions, ask your nurse or doctor.                CONTINUE these medicines which have NOT CHANGED        Dose / Directions   acetaminophen 500 MG tablet  Commonly known as: TYLENOL      Dose: 1,000 mg  Take 1,000 mg by mouth 2 times daily +650mg PO BID PRN  Refills: 0     albuterol 0.63 MG/3ML neb solution  Commonly known as: ACCUNEB      Dose: 1 ampule  Take 1 ampule by nebulization 2 times daily May also take BID PRN  Refills: 0     aspirin 81 MG chewable tablet  Commonly known as: ASA      Dose: 81 mg  Take 81 mg by mouth daily  Refills: 0     bisacodyl 10 MG suppository  Commonly known as: DULCOLAX      Dose: 10 mg  Place 1 suppository (10 mg)  rectally daily as needed for constipation  Refills: 0     carbamide peroxide 6.5 % otic solution  Commonly known as: DEBROX      Dose: 5 drop  Place 5 drops into both ears 2 times daily as needed for other  Refills: 0     diclofenac 1 % topical gel  Commonly known as: VOLTAREN      Apply topically 4 times daily as needed for moderate pain  Refills: 0     gabapentin 100 MG capsule  Commonly known as: NEURONTIN      200 mg in am and 400 mg at HS  Refills: 0     guaiFENesin 200 MG tablet  Commonly known as: ORGANIDIN      Dose: 400 mg  Take 400 mg by mouth every 4 hours as needed for cough  Refills: 0     hyoscyamine 0.125 MG sublingual tablet  Commonly known as: LEVSIN/SL      Dose: 0.125 mg  Place 1 tablet (0.125 mg) under the tongue every 4 hours as needed for cramping  Refills: 0     LORazepam 0.5 MG tablet  Commonly known as: ATIVAN      Dose: 0.25 mg  Take 0.5 tablets (0.25 mg) by mouth every 2 hours as needed for anxiety  Refills: 0     magnesium sulfate Gran granules  Commonly known as: EPSOM SALT      Apply topically At Bedtime Apply to warm water for foot soak topically at bedtime for pain in bilateral feet  Refills: 0     morphine 10 MG/ML injection      Dose: 5 mg  Inject 5 mg into the vein every 2 hours as needed for severe pain  Refills: 0     naloxone 0.4 MG/ML injection  Commonly known as: NARCAN      Dose: 0.1-0.4 mg  Inject 0.1-0.4 mg into the vein ) Inject 1 ml intramuscularly as needed  for opioid overdose Give 1 ml as needed for opioid  overdose. May give 1 ml in 2-3 minutes if patient is  still unresponsive  Refills: 0     polyethylene glycol 17 g packet  Commonly known as: MIRALAX      Dose: 17 g  Take 17 g by mouth daily as needed for constipation  Refills: 0     trolamine salicylate 10 % external cream  Commonly known as: ASPERCREME  Used for: Acute pain of right shoulder      Apply topically 2 times daily  Refills: 0           Case Management:  I have reviewed the care plan and MDS and do  "agree with the plan. Patient's desire to return to the community is currently living in a community environment. Information reviewed:  Medications, vital signs, orders, and nursing notes.    ROS:  4 point ROS including Respiratory, CV, GI and , other than that noted in the HPI,  is negative    Vitals:  /64   Pulse 61   Temp 97.6  F (36.4  C)   Resp 20   Ht 1.753 m (5' 9\")   Wt 79.1 kg (174 lb 4.8 oz)   SpO2 93%   BMI 25.74 kg/m    Body mass index is 25.74 kg/m .  Wt Readings from Last 5 Encounters:   05/12/24 79.1 kg (174 lb 4.8 oz)   03/26/24 77.6 kg (171 lb)   01/16/24 77.5 kg (170 lb 14.4 oz)   01/08/24 79 kg (174 lb 1.6 oz)   11/07/23 73.3 kg (161 lb 8 oz)       Exam:   GENERAL APPEARANCE:  in no distress,   RESP:  Unlabored breathing. CTA b/l. But diminished over the   cases.   CV:  S1S2 audible, regular HR, 3/6 systolic murmur appreciated over right side.  2+ and on the right foot and 3+ on the left foot pedal edema ABDOMEN:  soft, NT/ND, BS audible.   M/S:   no joint deformity noted on observation.   SKIN: keratotic whitish-grayish scab-like over lateral surface of left heel tender to touch.  Surrounding erythema is blanchable.  No drainage.  Tender to touch.    NEURO:   No NFD appreciated on observation.   PSYCH:  affect and mood normal    Lab/Diagnostic data: Reviewed in the chart and EHR.        ASSESSMENT/PLAN  ----------------------------   (J44.9) Moderate chronic obstructive pulmonary disease (H)   - appears stable. Ongoing productive cough, lung CTA b/l. Continue current regimen       Neuropathy  Keratotic tissue over left heel   - tender to touch, blanchable erythema, no drainage.  Pain with putting pressure on it especially at night, Has not been using Unu boot.   - will order foam dressing, and we put on neno boot, and felt comfortable.  Daughter reports that there is improvement to the swelling on the left leg.  -Counseled the daughter that we will try a foam dressing and Unna boot " prior to morphine at bedtime for pain but this might cause sedation and constipation as well.  Hospice team to continue to follow.    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 Change dressing q M/W/F and prn.      Slow transit constipation:  - on regimen, no  concern.       hx of NSTEMI (non-ST elevated myocardial infarction)   Coronary artery disease of native artery of native heart with stable angina pectoris (H)  Pulmonary arteriovenous malformation  Severe aortic stenosis  [facility] [place]  - some orthopnea and peripheral edema. Started on lasix 20 mg daily x 7 days on 5/10, improving.   - - analgesia is optimal with the current regimen.   - Appreciate collaboration with Hospice Team for symptom management in collaboration with cares for maximum comfort at end-of-life        Electronically signed by:  Tesfaye Boggs MD        Sincerely,        Tesfaye Boggs MD